# Patient Record
Sex: FEMALE | Race: BLACK OR AFRICAN AMERICAN | NOT HISPANIC OR LATINO | URBAN - METROPOLITAN AREA
[De-identification: names, ages, dates, MRNs, and addresses within clinical notes are randomized per-mention and may not be internally consistent; named-entity substitution may affect disease eponyms.]

---

## 2018-01-31 PROBLEM — Z00.00 ENCOUNTER FOR PREVENTIVE HEALTH EXAMINATION: Status: ACTIVE | Noted: 2018-01-31

## 2019-09-03 ENCOUNTER — EMERGENCY (EMERGENCY)
Facility: HOSPITAL | Age: 24
LOS: 1 days | Discharge: HOME | End: 2019-09-03
Attending: EMERGENCY MEDICINE | Admitting: EMERGENCY MEDICINE
Payer: MEDICARE

## 2019-09-03 VITALS
TEMPERATURE: 98 F | OXYGEN SATURATION: 100 % | RESPIRATION RATE: 18 BRPM | SYSTOLIC BLOOD PRESSURE: 132 MMHG | HEART RATE: 92 BPM | DIASTOLIC BLOOD PRESSURE: 78 MMHG

## 2019-09-03 PROCEDURE — 71046 X-RAY EXAM CHEST 2 VIEWS: CPT | Mod: 26

## 2019-09-03 PROCEDURE — 99283 EMERGENCY DEPT VISIT LOW MDM: CPT

## 2019-09-03 NOTE — ED PROVIDER NOTE - OBJECTIVE STATEMENT
24F with pmh of DM and intellectual disability p/w cough x 3 days. Pt is coming from group home. Staff reports that the patient starts gagging after coughing fits but no vomiting. No f/c, abd pain. Pt has normal appetite and has been having normal mental status. Hx from pt is limited 2/2 baseline intellectual disability.

## 2019-09-03 NOTE — ED PROVIDER NOTE - PATIENT PORTAL LINK FT
You can access the FollowMyHealth Patient Portal offered by [facility] by registering at the following website: [email or web address]. By joining [email or web address]’s FollowMyHealth portal, you will also be able to view your health information using other applications (apps) compatible with our system.

## 2019-09-03 NOTE — ED ADULT TRIAGE NOTE - CHIEF COMPLAINT QUOTE
Pt. arrives from [facility] [place] ([facility] (inpatient) with staff member at bedside, c/o dry cough x 3 days. Denies fever, chills, SOB. PMH: intellectual disability, DM. . Pt. arrives from [facility] [place] ([facility] (inpatient) with staff member at bedside, c/o dry cough x 3 days. Denies fever, chills, SOB. Calm and cooperative at present. Group home staff member states she will stay with pt. PMH: intellectual disability, DM. .

## 2019-09-03 NOTE — ED PROVIDER NOTE - PHYSICAL EXAMINATION
GEN - NAD; well appearing; alert, interactive  HEAD - NC/AT     EYES - EOMI, no conjunctival pallor, no scleral icterus  ENT -   mucous membranes  moist , no discharge      NECK - Neck supple  PULM - CTA b/l,  symmetric breath sounds  COR -  RRR, S1 S2, no murmurs  ABD - , ND, NT, soft, no guarding, no rebound, no masses    BACK - no CVA tenderness, nontender spine     EXTREMS - no edema, no deformity, warm and well perfused    SKIN - no rash or bruising      NEUROLOGIC - alert, sensation nl, motor 5/5 RUE/LUE/RLE/LLE

## 2019-09-03 NOTE — ED PROVIDER NOTE - CLINICAL SUMMARY MEDICAL DECISION MAKING FREE TEXT BOX
24F with pmh of DM, intellectual disability p/w cough x3 days. clear lungs, afebrile. likely viral syndrome, will r/o pna. possibly acute bronchitis. plan for CXR and outpatient f/u.

## 2019-09-03 NOTE — ED ADULT NURSE NOTE - CHIEF COMPLAINT QUOTE
Pt. arrives from [facility] [place] ([facility] Home (inpatient) with staff member at bedside, c/o dry cough x 3 days. Denies fever, chills, SOB. Calm and cooperative at present. Group home staff member states she will stay with pt. PMH: intellectual disability, DM. .

## 2019-12-27 ENCOUNTER — EMERGENCY (EMERGENCY)
Facility: HOSPITAL | Age: 24
LOS: 1 days | Discharge: HOME | End: 2019-12-27
Admitting: EMERGENCY MEDICINE
Payer: MEDICARE

## 2019-12-27 VITALS
OXYGEN SATURATION: 97 % | HEART RATE: 112 BPM | SYSTOLIC BLOOD PRESSURE: 127 MMHG | RESPIRATION RATE: 16 BRPM | TEMPERATURE: 98 F | DIASTOLIC BLOOD PRESSURE: 80 MMHG

## 2019-12-27 PROCEDURE — 99284 EMERGENCY DEPT VISIT MOD MDM: CPT

## 2019-12-27 RX ORDER — HALOPERIDOL DECANOATE 100 MG/ML
2.5 INJECTION INTRAMUSCULAR ONCE
Refills: 0 | Status: COMPLETED | OUTPATIENT
Start: 2019-12-27 | End: 2019-12-27

## 2019-12-27 RX ADMIN — Medication 2 MILLIGRAM(S): at 23:27

## 2019-12-27 RX ADMIN — HALOPERIDOL DECANOATE 2.5 MILLIGRAM(S): 100 INJECTION INTRAMUSCULAR at 23:27
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
## 2019-12-27 NOTE — ED PROVIDER NOTE - PATIENT PORTAL LINK FT
You can access the FollowMyHealth Patient Portal offered by [facility] by registering at the following website: [email or web address]. By joining [employer]’s FollowMyHealth portal, you will also be able to view your health information using other applications (apps) compatible with our system.
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
## 2019-12-28 PROBLEM — E11.9 TYPE 2 DIABETES MELLITUS WITHOUT COMPLICATIONS: Chronic | Status: ACTIVE | Noted: 2019-09-03

## 2019-12-28 PROBLEM — F79 UNSPECIFIED INTELLECTUAL DISABILITIES: Chronic | Status: ACTIVE | Noted: 2019-09-03

## 2019-12-28 NOTE — ED ADULT NURSE REASSESSMENT NOTE - GENERAL PATIENT STATE
pt remains awake, calm at baseline mental status. NAD, no aggression observed/comfortable appearance/cooperative

## 2020-01-12 NOTE — ED ADULT NURSE NOTE - NSFALLRSKOUTCOME_ED_ALL_ED
Please follow-up with Dr. Irizarry's office on January 21 at 9:30 AM.
Universal Safety Interventions

## 2022-10-17 ENCOUNTER — APPOINTMENT (OUTPATIENT)
Dept: OPHTHALMOLOGY | Facility: CLINIC | Age: 27
End: 2022-10-17

## 2022-11-03 PROBLEM — Z00.00 ENCOUNTER FOR PREVENTIVE HEALTH EXAMINATION: Status: ACTIVE | Noted: 2022-11-03

## 2022-11-11 ENCOUNTER — APPOINTMENT (OUTPATIENT)
Dept: OPHTHALMOLOGY | Facility: CLINIC | Age: 27
End: 2022-11-11

## 2022-11-30 ENCOUNTER — APPOINTMENT (OUTPATIENT)
Dept: OBGYN | Facility: HOSPITAL | Age: 27
End: 2022-11-30

## 2022-12-08 ENCOUNTER — OUTPATIENT (OUTPATIENT)
Dept: OUTPATIENT SERVICES | Facility: HOSPITAL | Age: 27
LOS: 1 days | End: 2022-12-08

## 2022-12-08 ENCOUNTER — APPOINTMENT (OUTPATIENT)
Dept: OBGYN | Facility: HOSPITAL | Age: 27
End: 2022-12-08
Payer: MEDICARE

## 2022-12-08 VITALS
TEMPERATURE: 97.9 F | SYSTOLIC BLOOD PRESSURE: 112 MMHG | BODY MASS INDEX: 37.77 KG/M2 | DIASTOLIC BLOOD PRESSURE: 68 MMHG | HEIGHT: 66 IN | HEART RATE: 104 BPM | WEIGHT: 235 LBS

## 2022-12-08 DIAGNOSIS — Z86.79 PERSONAL HISTORY OF OTHER DISEASES OF THE CIRCULATORY SYSTEM: ICD-10-CM

## 2022-12-08 DIAGNOSIS — F71 MODERATE INTELLECTUAL DISABILITIES: ICD-10-CM

## 2022-12-08 DIAGNOSIS — Z87.19 PERSONAL HISTORY OF OTHER DISEASES OF THE DIGESTIVE SYSTEM: ICD-10-CM

## 2022-12-08 DIAGNOSIS — F31.9 BIPOLAR DISORDER, UNSPECIFIED: ICD-10-CM

## 2022-12-08 DIAGNOSIS — Z86.39 PERSONAL HISTORY OF OTHER ENDOCRINE, NUTRITIONAL AND METABOLIC DISEASE: ICD-10-CM

## 2022-12-08 PROCEDURE — 99202 OFFICE O/P NEW SF 15 MIN: CPT | Mod: GE

## 2022-12-08 RX ORDER — DOCUSATE SODIUM 100 MG/1
CAPSULE, LIQUID FILLED ORAL
Refills: 0 | Status: ACTIVE | COMMUNITY

## 2022-12-08 RX ORDER — DESMOPRESSIN ACETATE 0.2 MG/1
TABLET ORAL
Refills: 0 | Status: ACTIVE | COMMUNITY

## 2022-12-08 RX ORDER — METFORMIN HYDROCHLORIDE 625 MG/1
TABLET ORAL
Refills: 0 | Status: ACTIVE | COMMUNITY

## 2022-12-08 RX ORDER — QUETIAPINE FUMARATE 400 MG/1
TABLET ORAL
Refills: 0 | Status: ACTIVE | COMMUNITY

## 2022-12-08 RX ORDER — LISINOPRIL 30 MG/1
TABLET ORAL
Refills: 0 | Status: ACTIVE | COMMUNITY

## 2022-12-08 RX ORDER — FAMOTIDINE 10 MG/1
TABLET, FILM COATED ORAL
Refills: 0 | Status: ACTIVE | COMMUNITY

## 2022-12-08 RX ORDER — DIVALPROEX SODIUM 500 MG/1
TABLET, DELAYED RELEASE ORAL
Refills: 0 | Status: ACTIVE | COMMUNITY

## 2022-12-09 DIAGNOSIS — Z00.00 ENCOUNTER FOR GENERAL ADULT MEDICAL EXAMINATION WITHOUT ABNORMAL FINDINGS: ICD-10-CM

## 2022-12-27 ENCOUNTER — NON-APPOINTMENT (OUTPATIENT)
Age: 27
End: 2022-12-27

## 2022-12-28 ENCOUNTER — APPOINTMENT (OUTPATIENT)
Dept: OBGYN | Facility: HOSPITAL | Age: 27
End: 2022-12-28

## 2023-01-03 NOTE — ED ADULT NURSE NOTE - CAS TRG GEN SKIN COLOR
(Newest Message First)  ROSSANA Palomo - CNP  [facility] [address] Staff 3 minutes ago (5:24 PM)     She can do an evisit - will need to be seen in office for next three month refill after that      CALLED, LMOM TO ADVISE.EUNICE Normal for race

## 2023-03-28 NOTE — ED PROVIDER NOTE - NSFOLLOWUPCLINICS_GEN_ALL_ED_FT
History/Exam/Medical decision making [facility]  Behavioral Health  [id number] [place] [address]  Phone: [phone]  Fax:   Follow Up Time:

## 2023-08-26 ENCOUNTER — EMERGENCY (EMERGENCY)
Facility: HOSPITAL | Age: 28
LOS: 1 days | Discharge: HOME | End: 2023-08-26
Attending: STUDENT IN AN ORGANIZED HEALTH CARE EDUCATION/TRAINING PROGRAM | Admitting: STUDENT IN AN ORGANIZED HEALTH CARE EDUCATION/TRAINING PROGRAM
Payer: MEDICARE

## 2023-08-26 VITALS
OXYGEN SATURATION: 99 % | DIASTOLIC BLOOD PRESSURE: 78 MMHG | RESPIRATION RATE: 20 BRPM | SYSTOLIC BLOOD PRESSURE: 127 MMHG | HEART RATE: 107 BPM | TEMPERATURE: 100 F

## 2023-08-26 LAB
ALBUMIN SERPL ELPH-MCNC: 4.1 G/DL — SIGNIFICANT CHANGE UP (ref 3.3–5)
ALP SERPL-CCNC: 72 U/L — SIGNIFICANT CHANGE UP (ref 40–120)
ALT FLD-CCNC: 16 U/L — SIGNIFICANT CHANGE UP (ref 4–33)
ANION GAP SERPL CALC-SCNC: 16 MMOL/L — HIGH (ref 7–14)
AST SERPL-CCNC: 17 U/L — SIGNIFICANT CHANGE UP (ref 4–32)
BASOPHILS # BLD AUTO: 0.03 K/UL — SIGNIFICANT CHANGE UP (ref 0–0.2)
BASOPHILS NFR BLD AUTO: 0.3 % — SIGNIFICANT CHANGE UP (ref 0–2)
BILIRUB SERPL-MCNC: <0.2 MG/DL — SIGNIFICANT CHANGE UP (ref 0.2–1.2)
BUN SERPL-MCNC: 9 MG/DL — SIGNIFICANT CHANGE UP (ref 7–23)
CALCIUM SERPL-MCNC: 9.5 MG/DL — SIGNIFICANT CHANGE UP (ref 8.4–10.5)
CHLORIDE SERPL-SCNC: 102 MMOL/L — SIGNIFICANT CHANGE UP (ref 98–107)
CO2 SERPL-SCNC: 22 MMOL/L — SIGNIFICANT CHANGE UP (ref 22–31)
CREAT SERPL-MCNC: 0.63 MG/DL — SIGNIFICANT CHANGE UP (ref 0.5–1.3)
EGFR: 124 ML/MIN/1.73M2 — SIGNIFICANT CHANGE UP
EOSINOPHIL # BLD AUTO: 0.02 K/UL — SIGNIFICANT CHANGE UP (ref 0–0.5)
EOSINOPHIL NFR BLD AUTO: 0.2 % — SIGNIFICANT CHANGE UP (ref 0–6)
GLUCOSE SERPL-MCNC: 108 MG/DL — HIGH (ref 70–99)
HCT VFR BLD CALC: 34.9 % — SIGNIFICANT CHANGE UP (ref 34.5–45)
HGB BLD-MCNC: 10.4 G/DL — LOW (ref 11.5–15.5)
IANC: 6.35 K/UL — SIGNIFICANT CHANGE UP (ref 1.8–7.4)
IMM GRANULOCYTES NFR BLD AUTO: 0.2 % — SIGNIFICANT CHANGE UP (ref 0–0.9)
LYMPHOCYTES # BLD AUTO: 2.14 K/UL — SIGNIFICANT CHANGE UP (ref 1–3.3)
LYMPHOCYTES # BLD AUTO: 23.2 % — SIGNIFICANT CHANGE UP (ref 13–44)
MCHC RBC-ENTMCNC: 20.1 PG — LOW (ref 27–34)
MCHC RBC-ENTMCNC: 29.8 GM/DL — LOW (ref 32–36)
MCV RBC AUTO: 67.4 FL — LOW (ref 80–100)
MONOCYTES # BLD AUTO: 0.65 K/UL — SIGNIFICANT CHANGE UP (ref 0–0.9)
MONOCYTES NFR BLD AUTO: 7.1 % — SIGNIFICANT CHANGE UP (ref 2–14)
NEUTROPHILS # BLD AUTO: 6.35 K/UL — SIGNIFICANT CHANGE UP (ref 1.8–7.4)
NEUTROPHILS NFR BLD AUTO: 69 % — SIGNIFICANT CHANGE UP (ref 43–77)
NRBC # BLD: 0 /100 WBCS — SIGNIFICANT CHANGE UP (ref 0–0)
NRBC # FLD: 0 K/UL — SIGNIFICANT CHANGE UP (ref 0–0)
PLATELET # BLD AUTO: 342 K/UL — SIGNIFICANT CHANGE UP (ref 150–400)
POTASSIUM SERPL-MCNC: 4.1 MMOL/L — SIGNIFICANT CHANGE UP (ref 3.5–5.3)
POTASSIUM SERPL-SCNC: 4.1 MMOL/L — SIGNIFICANT CHANGE UP (ref 3.5–5.3)
PROT SERPL-MCNC: 7.3 G/DL — SIGNIFICANT CHANGE UP (ref 6–8.3)
RBC # BLD: 5.18 M/UL — SIGNIFICANT CHANGE UP (ref 3.8–5.2)
RBC # FLD: 16.1 % — HIGH (ref 10.3–14.5)
SODIUM SERPL-SCNC: 140 MMOL/L — SIGNIFICANT CHANGE UP (ref 135–145)
WBC # BLD: 9.21 K/UL — SIGNIFICANT CHANGE UP (ref 3.8–10.5)
WBC # FLD AUTO: 9.21 K/UL — SIGNIFICANT CHANGE UP (ref 3.8–10.5)

## 2023-08-26 PROCEDURE — 99285 EMERGENCY DEPT VISIT HI MDM: CPT | Mod: FS

## 2023-08-26 RX ORDER — DIPHENHYDRAMINE HCL 50 MG
50 CAPSULE ORAL ONCE
Refills: 0 | Status: COMPLETED | OUTPATIENT
Start: 2023-08-26 | End: 2023-08-26

## 2023-08-26 RX ADMIN — Medication 50 MILLIGRAM(S): at 16:52

## 2023-08-26 NOTE — ED PROVIDER NOTE - PATIENT PORTAL LINK FT
You can access the FollowMyHealth Patient Portal offered by [facility] by registering at the following website: [email or web address]. By joining [employer]’s FollowMyHealth portal, you will also be able to view your health information using other applications (apps) compatible with our system.

## 2023-08-26 NOTE — ED PROVIDER NOTE - PHYSICAL EXAMINATION
CONSTITUTIONAL: non-verbal, uncooperative   HEAD: NCAT  EYES: exam severely limited by cooperation, some swelling periorbital and upper eyelid, sclera injected, pupil round - unable to assess EOM  ENT: MMM  NECK: Supple; non tender cervical spine, no step off over face or spine  CARD: RRR  RESP: lungs CTA b/l   ABD: soft, no guarding/rebound on exam   EXT: no edema CONSTITUTIONAL: non-verbal, uncooperative   HEAD: NCAT  EYES: exam severely limited by cooperation, some swelling periorbital and upper eyelid, sclera appeared injected, pupil round - unable to assess EOM   	ADDENDUM: please see progress note for rest of eye exam under tetracaine   ENT: MMM  NECK: Supple; non tender cervical spine, no step off over face or spine  CARD: RRR  RESP: lungs CTA b/l   ABD: soft, no guarding/rebound on exam   EXT: no edema

## 2023-08-26 NOTE — ED ADULT TRIAGE NOTE - SPO2 (%)
99 Saucerization Excision Additional Text (Leave Blank If You Do Not Want): The margin was drawn around the clinically apparent lesion.  Incisions were then made along these lines, in a tangential fashion, to the appropriate tissue plane and the lesion was extirpated.
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
## 2023-08-26 NOTE — ED PROVIDER NOTE - NSFOLLOWUPINSTRUCTIONS_ED_ALL_ED_FT
Rest, drink plenty of fluids.  Advance activity as tolerated.  Continue all previously prescribed medications as directed.  Follow up with your primary care physician in 48-72 hours- bring copies of your results.  Return to the ER for worsening or persistent symptoms, and/or ANY NEW OR CONCERNING SYMPTOMS. If you have issues obtaining follow up, please call: [phone] to obtain a doctor or specialist who takes your insurance in your area.  You may call [phone] to make an appointment with the internal medicine clinic.
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
## 2023-08-27 VITALS
OXYGEN SATURATION: 100 % | SYSTOLIC BLOOD PRESSURE: 118 MMHG | RESPIRATION RATE: 18 BRPM | DIASTOLIC BLOOD PRESSURE: 85 MMHG | HEART RATE: 97 BPM | TEMPERATURE: 98 F

## 2023-08-27 PROCEDURE — 70487 CT MAXILLOFACIAL W/DYE: CPT | Mod: 26,MA

## 2023-08-27 RX ORDER — ERYTHROMYCIN BASE 5 MG/GRAM
1 OINTMENT (GRAM) OPHTHALMIC (EYE) ONCE
Refills: 0 | Status: COMPLETED | OUTPATIENT
Start: 2023-08-27 | End: 2023-08-27

## 2023-08-27 RX ORDER — OFLOXACIN 0.3 %
1 DROPS OPHTHALMIC (EYE) ONCE
Refills: 0 | Status: COMPLETED | OUTPATIENT
Start: 2023-08-27 | End: 2023-08-27

## 2023-08-27 RX ADMIN — Medication 1 DROP(S): at 10:18

## 2023-08-27 RX ADMIN — Medication 1 APPLICATION(S): at 10:18
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
## 2023-08-27 NOTE — CONSULT NOTE ADULT - PROVIDER SPECIALTY LIST ADULT
Ophthalmology Brow Lift Text: A midfrontal incision was made medially to the defect to allow access to the tissues just superior to the left eyebrow. Following careful dissection inferiorly in a supraperiosteal plane to the level of the left eyebrow, several 3-0 monocryl sutures were used to resuspend the eyebrow orbicularis oculi muscular unit to the superior frontal bone periosteum. This resulted in an appropriate reapproximation of static eyebrow symmetry and correction of the left brow ptosis.

## 2023-08-27 NOTE — ED ADULT NURSE REASSESSMENT NOTE - NS ED NURSE REASSESS COMMENT FT1
Pt calm and cooperative. Medicated as per EMAR. Pt discharged back to group home. Staff member at bedside.
Pt received in room 17. pt minimally verbal, calm and cooperative. hx of intellectual disability with aid at bedside, swelling noted to right eye, pt able to open eye, unable to confirm or denies blurriness in vision. Awaiting for CT
Pt resting comfortably in bed. In no apparent distress. VS as noted. Awaiting for optho followup.
Received report from night shift RN Denisse. Osbaldoho at bedside for eval. Staff member at bedside.
Pt remains in ED, resting in stretcher with eyes closed, respirations even/unlabored, on room air. Pt awaiting CT and optho consult. Group home staff member remains at bedside. Pt offers no c/o pain or discomfort at this time. All safety precautions in place.

## 2023-08-27 NOTE — CONSULT NOTE ADULT - ASSESSMENT
Assessment and Recommendations:  28y female with past medical history of intellectual disability, no ocular history, consulted for ocular trauma right eye.     #Ocular Trauma Right Eye  - Unable to obtain accurate Snellen Va due  to intellectual disability and lack of cooperation. Pt fixes and follows OU  - Perrl NO APD. Round pupils.   - IOP 18, AC formed, no hyphema. Iris intact  - Cornea with diffuse epi defect. Conj with  temporal chemosis. Kadeem negative  - Retina flat, no RD Optic nerve with shapr disc margins OU  - No evidence of globe rupture clinically  - Radiology reviewed 0 noted read of globe rupture, however no signs of rupture clinically. No facial fractures.   - Recommend ofloxacin qid and erythromycin BID OD.   - Will arrange for follow-up.      JED Olsen, trauma attending    Outpatient Follow-up: Patient should follow-up with his/her ophthalmologist or with Health system Department of Ophthalmology within 1 week of after discharge at:    [address]  [place] [address]  [phone]    Parth Cifuentes MD PGY 3  Available on Microsoft Teams Assessment and Recommendations:  28y female with past medical history of intellectual disability, no ocular history, consulted for ocular trauma right eye.     #Ocular Trauma Right Eye  - Unable to obtain accurate Snellen Va due  to intellectual disability and lack of cooperation. Pt fixes and follows OU  - Perrl NO APD. Round pupils.   - IOP 18, AC formed, no hyphema. Iris intact  - Cornea with diffuse epi defect. Conj with  temporal chemosis. Kadeem negative  - Retina flat, no RD Optic nerve with shapr disc margins OU  - No evidence of globe rupture clinically  - Radiology reviewed 0 noted read of globe rupture, however no signs of rupture clinically. No facial fractures.   - Recommend ofloxacin qid and erythromycin BID OD.   - Will arrange for follow-up.      JED Olsen, trauma attending    Outpatient Follow-up: Patient should follow-up with his/her ophthalmologist or with [facility] Department of Ophthalmology within 1 week of after discharge at:    [address]  [place] [address]  [phone]    Parth Cifuentes MD PGY 3  Available on Microsoft Teams Assessment and Recommendations:  28y female with past medical history of intellectual disability, no ocular history, consulted for ocular trauma right eye.     #Ocular Trauma Right Eye  - Unable to obtain accurate Snellen Va due  to intellectual disability and lack of cooperation. Pt fixes and follows OU  - Perrl NO APD. Round pupils.   - IOP 18, AC formed, no hyphema. Iris intact  - Cornea with diffuse epi defect. Conj with  temporal chemosis. Kadeem negative  - Retina flat, no RD Optic nerve with shapr disc margins OU  - No evidence of globe rupture clinically  - Radiology reviewed 0 noted read of globe rupture, however no signs of rupture clinically. No facial fractures.   - Recommend ofloxacin qid and erythromycin BID OD.   - Will arrange for follow-up.      JED Olsen, trauma attending    Outpatient Follow-up: Patient should follow-up with his/her ophthalmologist or with [facility] Department of Ophthalmology within 1 week of after discharge at:    [address]  [place] [address]  [phone]    Parth Cifuentes MD PGY 3  Available on Microsoft Teams

## 2024-02-10 ENCOUNTER — INPATIENT (INPATIENT)
Facility: HOSPITAL | Age: 29
LOS: 5 days | Discharge: HOME | End: 2024-02-16
Attending: HOSPITALIST | Admitting: HOSPITALIST
Payer: MEDICARE

## 2024-02-10 VITALS
HEART RATE: 96 BPM | RESPIRATION RATE: 16 BRPM | TEMPERATURE: 98 F | DIASTOLIC BLOOD PRESSURE: 75 MMHG | OXYGEN SATURATION: 100 % | SYSTOLIC BLOOD PRESSURE: 111 MMHG

## 2024-02-10 PROCEDURE — 99285 EMERGENCY DEPT VISIT HI MDM: CPT

## 2024-02-10 NOTE — ED ADULT TRIAGE NOTE - NS ED NURSE BANDS TYPE
Leatha GARCIA is a 52 year old female presenting with non-radiating lower back pain.       ALLERGIES:  No Known Allergies     Denies Latex allergy or sensitivity.     Patient notes that she currently is not a smoker.    Visit Vitals  LMP 09/21/2021       No changes to Patient's medical history or social history since their last visit.         Name band;

## 2024-02-11 DIAGNOSIS — K81.0 ACUTE CHOLECYSTITIS: ICD-10-CM

## 2024-02-11 DIAGNOSIS — Z01.818 ENCOUNTER FOR OTHER PREPROCEDURAL EXAMINATION: ICD-10-CM

## 2024-02-11 DIAGNOSIS — E11.9 TYPE 2 DIABETES MELLITUS WITHOUT COMPLICATIONS: ICD-10-CM

## 2024-02-11 DIAGNOSIS — Z29.9 ENCOUNTER FOR PROPHYLACTIC MEASURES, UNSPECIFIED: ICD-10-CM

## 2024-02-11 DIAGNOSIS — I10 ESSENTIAL (PRIMARY) HYPERTENSION: ICD-10-CM

## 2024-02-11 DIAGNOSIS — R10.9 UNSPECIFIED ABDOMINAL PAIN: ICD-10-CM

## 2024-02-11 DIAGNOSIS — Z79.899 OTHER LONG TERM (CURRENT) DRUG THERAPY: ICD-10-CM

## 2024-02-11 DIAGNOSIS — N30.00 ACUTE CYSTITIS WITHOUT HEMATURIA: ICD-10-CM

## 2024-02-11 LAB
ALBUMIN SERPL ELPH-MCNC: 4 G/DL — SIGNIFICANT CHANGE UP (ref 3.3–5)
ALP SERPL-CCNC: 163 U/L — HIGH (ref 40–120)
ALT FLD-CCNC: 343 U/L — HIGH (ref 4–33)
ANION GAP SERPL CALC-SCNC: 18 MMOL/L — HIGH (ref 7–14)
APPEARANCE UR: ABNORMAL
AST SERPL-CCNC: 452 U/L — HIGH (ref 4–32)
BACTERIA # UR AUTO: ABNORMAL /HPF
BASOPHILS # BLD AUTO: 0.02 K/UL — SIGNIFICANT CHANGE UP (ref 0–0.2)
BASOPHILS NFR BLD AUTO: 0.2 % — SIGNIFICANT CHANGE UP (ref 0–2)
BILIRUB SERPL-MCNC: 0.9 MG/DL — SIGNIFICANT CHANGE UP (ref 0.2–1.2)
BILIRUB UR-MCNC: ABNORMAL
BUN SERPL-MCNC: 11 MG/DL — SIGNIFICANT CHANGE UP (ref 7–23)
CALCIUM SERPL-MCNC: 9.5 MG/DL — SIGNIFICANT CHANGE UP (ref 8.4–10.5)
CAST: 1 /LPF — SIGNIFICANT CHANGE UP (ref 0–4)
CHLORIDE SERPL-SCNC: 101 MMOL/L — SIGNIFICANT CHANGE UP (ref 98–107)
CO2 SERPL-SCNC: 17 MMOL/L — LOW (ref 22–31)
COLOR SPEC: SIGNIFICANT CHANGE UP
CREAT SERPL-MCNC: 0.59 MG/DL — SIGNIFICANT CHANGE UP (ref 0.5–1.3)
DIFF PNL FLD: NEGATIVE — SIGNIFICANT CHANGE UP
EGFR: 126 ML/MIN/1.73M2 — SIGNIFICANT CHANGE UP
EOSINOPHIL # BLD AUTO: 0 K/UL — SIGNIFICANT CHANGE UP (ref 0–0.5)
EOSINOPHIL NFR BLD AUTO: 0 % — SIGNIFICANT CHANGE UP (ref 0–6)
GLUCOSE SERPL-MCNC: 106 MG/DL — HIGH (ref 70–99)
GLUCOSE UR QL: NEGATIVE MG/DL — SIGNIFICANT CHANGE UP
HCT VFR BLD CALC: 36.8 % — SIGNIFICANT CHANGE UP (ref 34.5–45)
HGB BLD-MCNC: 11.5 G/DL — SIGNIFICANT CHANGE UP (ref 11.5–15.5)
IANC: 8.79 K/UL — HIGH (ref 1.8–7.4)
IMM GRANULOCYTES NFR BLD AUTO: 0.5 % — SIGNIFICANT CHANGE UP (ref 0–0.9)
KETONES UR-MCNC: 15 MG/DL
LEUKOCYTE ESTERASE UR-ACNC: ABNORMAL
LYMPHOCYTES # BLD AUTO: 19.3 % — SIGNIFICANT CHANGE UP (ref 13–44)
LYMPHOCYTES # BLD AUTO: 2.32 K/UL — SIGNIFICANT CHANGE UP (ref 1–3.3)
MCHC RBC-ENTMCNC: 20.4 PG — LOW (ref 27–34)
MCHC RBC-ENTMCNC: 31.3 GM/DL — LOW (ref 32–36)
MCV RBC AUTO: 65.1 FL — LOW (ref 80–100)
MONOCYTES # BLD AUTO: 0.82 K/UL — SIGNIFICANT CHANGE UP (ref 0–0.9)
MONOCYTES NFR BLD AUTO: 6.8 % — SIGNIFICANT CHANGE UP (ref 2–14)
NEUTROPHILS # BLD AUTO: 8.79 K/UL — HIGH (ref 1.8–7.4)
NEUTROPHILS NFR BLD AUTO: 73.2 % — SIGNIFICANT CHANGE UP (ref 43–77)
NITRITE UR-MCNC: NEGATIVE — SIGNIFICANT CHANGE UP
NRBC # BLD: 0 /100 WBCS — SIGNIFICANT CHANGE UP (ref 0–0)
NRBC # FLD: 0 K/UL — SIGNIFICANT CHANGE UP (ref 0–0)
PH UR: 6.5 — SIGNIFICANT CHANGE UP (ref 5–8)
PLATELET # BLD AUTO: 347 K/UL — SIGNIFICANT CHANGE UP (ref 150–400)
POTASSIUM SERPL-MCNC: 5 MMOL/L — SIGNIFICANT CHANGE UP (ref 3.5–5.3)
POTASSIUM SERPL-SCNC: 5 MMOL/L — SIGNIFICANT CHANGE UP (ref 3.5–5.3)
PROT SERPL-MCNC: 7.6 G/DL — SIGNIFICANT CHANGE UP (ref 6–8.3)
PROT UR-MCNC: SIGNIFICANT CHANGE UP MG/DL
RBC # BLD: 5.65 M/UL — HIGH (ref 3.8–5.2)
RBC # FLD: 17.8 % — HIGH (ref 10.3–14.5)
RBC CASTS # UR COMP ASSIST: 52 /HPF — HIGH (ref 0–4)
SODIUM SERPL-SCNC: 136 MMOL/L — SIGNIFICANT CHANGE UP (ref 135–145)
SP GR SPEC: 1.02 — SIGNIFICANT CHANGE UP (ref 1–1.03)
SQUAMOUS # UR AUTO: 8 /HPF — HIGH (ref 0–5)
UROBILINOGEN FLD QL: 2 MG/DL (ref 0.2–1)
WBC # BLD: 12.01 K/UL — HIGH (ref 3.8–10.5)
WBC # FLD AUTO: 12.01 K/UL — HIGH (ref 3.8–10.5)
WBC UR QL: 2 /HPF — SIGNIFICANT CHANGE UP (ref 0–5)

## 2024-02-11 PROCEDURE — 76705 ECHO EXAM OF ABDOMEN: CPT | Mod: 26

## 2024-02-11 PROCEDURE — 99232 SBSQ HOSP IP/OBS MODERATE 35: CPT | Mod: GC

## 2024-02-11 PROCEDURE — 78226 HEPATOBILIARY SYSTEM IMAGING: CPT | Mod: 26,GC

## 2024-02-11 PROCEDURE — 74177 CT ABD & PELVIS W/CONTRAST: CPT | Mod: 26,MA

## 2024-02-11 PROCEDURE — 99223 1ST HOSP IP/OBS HIGH 75: CPT

## 2024-02-11 RX ORDER — LANOLIN ALCOHOL/MO/W.PET/CERES
3 CREAM (GRAM) TOPICAL AT BEDTIME
Refills: 0 | Status: DISCONTINUED | OUTPATIENT
Start: 2024-02-11 | End: 2024-02-16

## 2024-02-11 RX ORDER — DIVALPROEX SODIUM 500 MG/1
500 TABLET, DELAYED RELEASE ORAL
Refills: 0 | Status: ACTIVE | OUTPATIENT
Start: 2024-02-11 | End: 2025-01-09

## 2024-02-11 RX ORDER — DEXTROSE 50 % IN WATER 50 %
12.5 SYRINGE (ML) INTRAVENOUS ONCE
Refills: 0 | Status: DISCONTINUED | OUTPATIENT
Start: 2024-02-11 | End: 2024-02-16

## 2024-02-11 RX ORDER — GLUCAGON INJECTION, SOLUTION 0.5 MG/.1ML
1 INJECTION, SOLUTION SUBCUTANEOUS ONCE
Refills: 0 | Status: DISCONTINUED | OUTPATIENT
Start: 2024-02-11 | End: 2024-02-16

## 2024-02-11 RX ORDER — DEXTROSE 50 % IN WATER 50 %
25 SYRINGE (ML) INTRAVENOUS ONCE
Refills: 0 | Status: DISCONTINUED | OUTPATIENT
Start: 2024-02-11 | End: 2024-02-16

## 2024-02-11 RX ORDER — PIPERACILLIN AND TAZOBACTAM 4; .5 G/20ML; G/20ML
3.38 INJECTION, POWDER, LYOPHILIZED, FOR SOLUTION INTRAVENOUS ONCE
Refills: 0 | Status: COMPLETED | OUTPATIENT
Start: 2024-02-11 | End: 2024-02-11

## 2024-02-11 RX ORDER — METRONIDAZOLE 500 MG
500 TABLET ORAL ONCE
Refills: 0 | Status: COMPLETED | OUTPATIENT
Start: 2024-02-11 | End: 2024-02-11

## 2024-02-11 RX ORDER — SODIUM CHLORIDE 9 MG/ML
1000 INJECTION, SOLUTION INTRAVENOUS
Refills: 0 | Status: DISCONTINUED | OUTPATIENT
Start: 2024-02-11 | End: 2024-02-16

## 2024-02-11 RX ORDER — DEXTROSE 50 % IN WATER 50 %
15 SYRINGE (ML) INTRAVENOUS ONCE
Refills: 0 | Status: DISCONTINUED | OUTPATIENT
Start: 2024-02-11 | End: 2024-02-16

## 2024-02-11 RX ORDER — PIPERACILLIN AND TAZOBACTAM 4; .5 G/20ML; G/20ML
3.38 INJECTION, POWDER, LYOPHILIZED, FOR SOLUTION INTRAVENOUS ONCE
Refills: 0 | Status: COMPLETED | OUTPATIENT
Start: 2024-02-12 | End: 2024-02-12

## 2024-02-11 RX ORDER — INSULIN LISPRO 100/ML
VIAL (ML) SUBCUTANEOUS EVERY 6 HOURS
Refills: 0 | Status: DISCONTINUED | OUTPATIENT
Start: 2024-02-11 | End: 2024-02-13

## 2024-02-11 RX ORDER — METRONIDAZOLE 500 MG
TABLET ORAL
Refills: 0 | Status: DISCONTINUED | OUTPATIENT
Start: 2024-02-11 | End: 2024-02-11

## 2024-02-11 RX ORDER — ACETAMINOPHEN 500 MG
650 TABLET ORAL EVERY 6 HOURS
Refills: 0 | Status: ACTIVE | OUTPATIENT
Start: 2024-02-11 | End: 2025-01-09

## 2024-02-11 RX ORDER — FAMOTIDINE 10 MG/ML
40 INJECTION INTRAVENOUS DAILY
Refills: 0 | Status: DISCONTINUED | OUTPATIENT
Start: 2024-02-11 | End: 2024-02-16

## 2024-02-11 RX ORDER — LISINOPRIL 2.5 MG/1
20 TABLET ORAL DAILY
Refills: 0 | Status: ACTIVE | OUTPATIENT
Start: 2024-02-11 | End: 2025-01-09

## 2024-02-11 RX ORDER — METRONIDAZOLE 500 MG
500 TABLET ORAL EVERY 8 HOURS
Refills: 0 | Status: DISCONTINUED | OUTPATIENT
Start: 2024-02-11 | End: 2024-02-11

## 2024-02-11 RX ORDER — PIPERACILLIN AND TAZOBACTAM 4; .5 G/20ML; G/20ML
3.38 INJECTION, POWDER, LYOPHILIZED, FOR SOLUTION INTRAVENOUS EVERY 8 HOURS
Refills: 0 | Status: DISCONTINUED | OUTPATIENT
Start: 2024-02-12 | End: 2024-02-16

## 2024-02-11 RX ORDER — SODIUM CHLORIDE 9 MG/ML
1000 INJECTION, SOLUTION INTRAVENOUS ONCE
Refills: 0 | Status: COMPLETED | OUTPATIENT
Start: 2024-02-11 | End: 2024-02-11

## 2024-02-11 RX ORDER — CEFTRIAXONE 500 MG/1
1000 INJECTION, POWDER, FOR SOLUTION INTRAMUSCULAR; INTRAVENOUS ONCE
Refills: 0 | Status: COMPLETED | OUTPATIENT
Start: 2024-02-11 | End: 2024-02-11

## 2024-02-11 RX ADMIN — PIPERACILLIN AND TAZOBACTAM 25 GRAM(S): 4; .5 INJECTION, POWDER, LYOPHILIZED, FOR SOLUTION INTRAVENOUS at 17:49

## 2024-02-11 RX ADMIN — SODIUM CHLORIDE 1000 MILLILITER(S): 9 INJECTION, SOLUTION INTRAVENOUS at 02:21

## 2024-02-11 RX ADMIN — DIVALPROEX SODIUM 500 MILLIGRAM(S): 500 TABLET, DELAYED RELEASE ORAL at 17:49

## 2024-02-11 RX ADMIN — Medication 100 MILLIGRAM(S): at 08:54

## 2024-02-11 RX ADMIN — CEFTRIAXONE 100 MILLIGRAM(S): 500 INJECTION, POWDER, FOR SOLUTION INTRAMUSCULAR; INTRAVENOUS at 08:06

## 2024-02-11 RX ADMIN — PIPERACILLIN AND TAZOBACTAM 200 GRAM(S): 4; .5 INJECTION, POWDER, LYOPHILIZED, FOR SOLUTION INTRAVENOUS at 16:20

## 2024-02-11 NOTE — H&P ADULT - PROBLEM SELECTOR PLAN 1
#Suspected cholecystitis  - RUQ US: cholelithiasis with associated gallbladder wall thickening  - CTAP: mild gallbladder wall edema  - HIDA scan: significantly delayed gallbladder filling may be see with acute/subacute cholecystitis  -  and bilirubin noted in urine, ,   - trend LFTs  - empirically covered with zosyn  - avoid hepatotoxins - surgery evaluation appreciated – no acute surgical intervention as suspicion low for acute cholecystitis (prior to HIDA scan)  - appreciate surgery input

## 2024-02-11 NOTE — H&P ADULT - PROBLEM SELECTOR PLAN 7
#VTE PPx: SCDs since she may go to OR  #GI: famotidine  #Diet: NPO for possible OR  #Dispo: admit to medicine. F/u surgery.    Discussed with  and patient's brother Shahbaz Seth [phone]

## 2024-02-11 NOTE — ED PROVIDER NOTE - CARE PLAN
Principal Discharge DX:	Abdominal pain with vomiting  Secondary Diagnosis:	Gallstones  Secondary Diagnosis:	Acute UTI   1

## 2024-02-11 NOTE — ED PROVIDER NOTE - PROGRESS NOTE DETAILS
JOSE ANTONIO Garcia MD - PGY-5: Discussed with surgical resident, they are uncertain of cholecystitis, recommend HIDA scan, treatment with abx for UTI until that time.  They refuse admittance to their service until operation per resident Jacqueline who discussed with attending. JOSE ANTONIO Garcia MD - PGY-5: Discussed with surgical resident, they are uncertain of cholecystitis, recommend HIDA scan, treatment with abx for UTI until that time.  They recommend admittance to medicine service until operation per resident Jacqueline who discussed with attending. Discussed with representative from facility.  RN at facility # - [phone].    Brother who provides consent (Shahbaz Seth): [phone]

## 2024-02-11 NOTE — H&P ADULT - PROBLEM SELECTOR PLAN 2
- patient is planned potentially for laparoscopic cholecystectomy  - baseline mets >4 per staff and family  - no personal or family reactions to anesthesia, or excessive bleeding/clotting  - ECG: NSR, no ischemic ST-T changes  - no absolute contraindications such as acute coronary syndrome, decompensated heart failure, or malignant arrhythmia  - Cr <2, T2DM w/o long-term use of insulin, no hx of stroke, CAD, of CHF  - RCRI of 0 confers a 0.4% estimated rate of myocardial infarction, pulmonary edema, ventricular fibrillation, cardiac arrest, or complete heart block (Garland 1999) or a 3.9% 30-day risk of death, MI, or cardiac arrest (Neto 2017)  - patient is low risk for an intermediate risk procedure; no further cardiac testing is required at this time and she may proceed with the planned procedure

## 2024-02-11 NOTE — CONSULT NOTE ADULT - ATTENDING COMMENTS
Ayse displayed signs consistent with abdominal pain to her care team. CT reviewed, labs reviewed.   Given unclear exam, agree with HIDA  IV abx  Home meds  Medical consultation  Surgery to follow closely, please call with any acute change or questions.    Abdominal pain - NOS, urine culture pending  May need reimaging if does not improve      Shayan Hanley MD  Acute and Critical Care Surgery    The Acute Care Surgery (B Team) Attending Group Practice:  Dr. Albin Ricks, Dr. Siddhartha Veliz, Dr. Shayan Hanley,  Dr. Jono Haque and Dr. Leatha John     Urgent issues - spectra [id number] or [id number]  Nonurgent issues - [phone]  Patient appointments or after hours - [phone]

## 2024-02-11 NOTE — ED PROVIDER NOTE - OBJECTIVE STATEMENT
29 y/o female w PMHx of severe intellectual disability, HTN, diabetes, obesity, enlarged thyroid presenting with 4 episodes of vomiting following mac-and-cheese at lunch. Pt is nonverbal and acccompanied by , who provided most of the history. Pt has thrown up multiple times today, states vomit consisted of food, no bloody emesis. No one else in the group home got sick. No recent sick contacts or travel. No new medications. Denies fever, chills, cough, night sweats, chills, abdominal pain, pain with urination, diarrhea, constipation. 29 y/o female w PMHx of severe intellectual disability, HTN, diabetes, obesity, enlarged thyroid presenting with 4 episodes of vomiting following mac-and-cheese at lunch. Pt is nonverbal and accompanied by , who provided most of the history. Pt has thrown up multiple times today, states vomit consisted of food, no bloody emesis. No one else in the group home got sick. No recent sick contacts or travel. No new medications. Denies fever, chills, cough, night sweats, chills, abdominal pain, pain with urination, diarrhea, constipation.

## 2024-02-11 NOTE — H&P ADULT - PROBLEM SELECTOR PLAN 4
- UA: cloudy, 15 ketones, small bilirubin, small LE, 2 WBCs, 52 RBCs, mod bacteria, 8 epis  - CTAP: mild circumferential urinary bladder wall thickening compatible with cystitis  - obtain UCx  - empirically covered with zosyn

## 2024-02-11 NOTE — ED PROVIDER NOTE - ATTENDING CONTRIBUTION TO CARE
27yo F ho intellectual disability, minimally verbal, DM2, pw 3-4 episodes of vomiting today. started shortly after eating mac and cheese, does not express any abdominal pain, or urinary sx, has no ho abdominal surgeries, no diarrhea, no uri sx  pt well appearing, abd nonteder, consider gastritis, viral gastro  will cehck labs, supportive care, consider imaging if labs wnl or not improving/tolerating PO

## 2024-02-11 NOTE — CONSULT NOTE ADULT - ASSESSMENT
28 yof PMH HTN,DM, obesity and severe developmental delay presents from her group home for emesis x4 NBNB with her health aid. Unable to give any ROS/history but home health aid reports no other concerning symptoms noticed except emesis. Exam without any abdominal tenderness, bloating or distention. Labs with mild leukocytosis to 12 and isolate transaminitis and elevated alk phos. General surgery consulted for r/o cholecystitis.    Plan:  - no acute surgical intervention, low concern for acute cholecystitis at this time  - would obtain CTAP with IV contrast  to continue investigation of leukocytosis and clarify possible intraabdominal pathology  - Can also consider HIDA scan if CTAP does not result in significant finding  - Continue to attempt to clarify patient HCP/surrogate decision maker  - Surgery will follow    Discussed with Dr. Dayan VELARDE Team [id number] 28 yof PMH HTN,DM, obesity and severe developmental delay presents from her group home for emesis x4 NBNB with her health aid. Unable to give any ROS/history but home health aid reports no other concerning symptoms noticed except emesis. Exam without any abdominal tenderness, bloating or distention. Labs with mild leukocytosis to 12 and isolate transaminitis and elevated alk phos. General surgery consulted for r/o cholecystitis.    Plan:  - No acute surgical intervention, low concern for acute cholecystitis at this time  - RUQ US and CT scan reviewed      - Obtain HIDA scan for further evaluation   - Continue IV abx, recommend Zosyn   - UTI/cystitis treatment   - Continue to attempt to clarify patient HCP/surrogate decision maker  - Surgery will follow    Discussed with Dr. Dayan VELARDE Team [id number]

## 2024-02-11 NOTE — ED ADULT NURSE NOTE - OBJECTIVE STATEMENT
pt received to tr a  , a&ox0 , ambulatory , phx of DM 2 and intellectual disabilities , p/w vomiting today x 3 after eating Mac and cheese. pt has no active vomiting at this time  . Pt breathing even and unlabored on room air. Denies fever, chills, cough, SOB, chest pain, palpitations, dizziness, nausea, vomiting, diarrhea, constipation, numbness, tingling. pt educated on fall precautions and confirms understanding via teach back method. Stretcher locked in lowest position with siderails up x2. Call bell and personal items within reach.

## 2024-02-11 NOTE — H&P ADULT - NSHPLABSRESULTS_GEN_ALL_CORE
LABS:                        11.5   12. )-----------( 347      ( 2024 00:34 )             36.8         136  |  101  |  11  ----------------------------<  106<H>  5.0   |  17<L>  |  0.59    Ca    9.5      2024 00:34    TPro  7.6  /  Alb  4.0  /  TBili  0.9  /  DBili  x   /  AST  452<H>  /  ALT  343<H>  /  AlkPhos  163<H>            Urinalysis Basic - ( 2024 00:35 )    Color: Dark Yellow / Appearance: Cloudy / S.018 / pH: x  Gluc: x / Ketone: 15 mg/dL  / Bili: Small / Urobili: 2.0 mg/dL   Blood: x / Protein: Trace mg/dL / Nitrite: Negative   Leuk Esterase: Small / RBC: 52 /HPF / WBC 2 /HPF   Sq Epi: x / Non Sq Epi: 8 /HPF / Bacteria: Moderate /HPF

## 2024-02-11 NOTE — H&P ADULT - NSHPPHYSICALEXAM_GEN_ALL_CORE
PHYSICAL EXAM:  Vital Signs Last 24 Hrs  T(C): 36.1 (11 Feb 2024 13:28), Max: 36.8 (11 Feb 2024 04:25)  T(F): 97 (11 Feb 2024 13:28), Max: 98.3 (11 Feb 2024 04:25)  HR: 81 (11 Feb 2024 13:28) (81 - 96)  BP: 107/78 (11 Feb 2024 13:28) (107/78 - 119/87)  BP(mean): --  RR: 20 (11 Feb 2024 13:28) (16 - 20)  SpO2: 97% (11 Feb 2024 13:28) (97% - 100%)    Parameters below as of 11 Feb 2024 13:28  Patient On (Oxygen Delivery Method): room air      CONSTITUTIONAL: NAD, obese, well-groomed  EYES: PERRLA; conjunctiva and sclera clear  ENMT: Moist oral mucosa, no pharyngeal injection or exudates; normal dentition  NECK: Supple, no palpable masses; no thyromegaly  RESPIRATORY: Normal respiratory effort; lungs are clear to auscultation bilaterally  CARDIOVASCULAR: Regular rate and rhythm, normal S1 and S2, no murmur/rub/gallop; No lower extremity edema; Peripheral pulses are 2+ bilaterally  ABDOMEN: Nontender to palpation, normoactive bowel sounds, no rebound/guarding; No hepatosplenomegaly  MUSCULOSKELETAL: no clubbing or cyanosis of digits; no joint swelling or tenderness to palpation  PSYCH: affect flat; nonverbal  NEUROLOGY: awake, tracking movements, moves upper extremities spontaneously, not cooperative with rest of neurologic exam  SKIN: No rashes; no palpable lesions

## 2024-02-11 NOTE — H&P ADULT - PROBLEM SELECTOR PLAN 5
Pt. resting quietly, family in room, breathing tx in process, pt. Wwthout complaint at this time. - resume home antihypertensives – lisinopril 20mg po daily

## 2024-02-11 NOTE — ED ADULT NURSE NOTE - NSFALLUNIVINTERV_ED_ALL_ED
Bed/Stretcher in lowest position, wheels locked, appropriate side rails in place/Call bell, personal items and telephone in reach/Instruct patient to call for assistance before getting out of bed/chair/stretcher/Non-slip footwear applied when patient is off stretcher/[place] to call system/Physically safe environment - no spills, clutter or unnecessary equipment/Purposeful proactive rounding/Room/bathroom lighting operational, light cord in reach

## 2024-02-11 NOTE — H&P ADULT - HISTORY OF PRESENT ILLNESS
Patient is a 27yo F with PMH significant for obesity, HTN, and DM who presented from group home after multiple episodes of emesis of gastric contents on day of presentation. The patient is non-verbal and unable to offer complaints or ROS. I spoke to the manager at the group home who corroborated her medical history. She also stated she has been having some strange behavior recently, though has given no indications she was in pain. She gave me the contact information for her brother, Shahbaz Seth [phone]. I spoke to him and was able to gather some additional history from him – she is ambulatory at baseline with no noted difficulty breathing. She has had surgery once a long time ago for skin grafting. No family history of excessive bleeding or clotting, or of reactions to anesthesia. She does not use insulin for diabetic control.     Vital signs significant for: afebrile, HR 80-90s bpm, normotensive, RR 16-20, SpO2 100% on RA  Labs significant for: WBC 12.01, MCV 65.1, CO2 17, AG 18, , , . UA: cloudy, 15 ketones, small bilirubin, small LE, 2 WBCs, 52 RBCs, mod bacteria, 8 epis  Imaging significant for:   - RUQ US: cholelithiasis with associated gallbladder wall thickening  - CTAP: mild gallbladder wall edema; mild circumferential urinary bladder wall thickening compatible with cystitis  - HIDA scan: significantly delayed gallbladder filling may be see with acute/subacute cholecystitis

## 2024-02-11 NOTE — H&P ADULT - PROBLEM SELECTOR PLAN 6
- continue home meds: divalproex ER 500mg po BID, folate, MVI  - clarify indications particularly for desmopressin 0.2mg po qhs (held for now), divalproex ER 500mg po BID, and Seroquel 150mg po BID

## 2024-02-11 NOTE — H&P ADULT - ASSESSMENT
Patient is a 27yo F with PMH significant for obesity, HTN, and DM who presented from [facility] home after multiple episodes of emesis of gastric contents on day of presentation, admitted with suspected cholecystitis.

## 2024-02-11 NOTE — ED ADULT NURSE NOTE - CHIEF COMPLAINT QUOTE
Pt arrives from group home, nonverbal at baseline, as per EMS pt has been vomiting throughout the day, no diarrhea. Past medical history of intellectual disabilities, DM type 2, non-insulin dependent.

## 2024-02-11 NOTE — CONSULT NOTE ADULT - SUBJECTIVE AND OBJECTIVE BOX
Surgery Consult Note  Attending: Dayan  Service: B Team  [id number]      HPI: 28y Female PMH obesity, HTN, DM presents after having emesis x4 at her group home today after eating lunch. Patient is severely developmentally delayed and unable to give any medical history. At bedside is a health aid who said she threw up after eating mac and cheese but did not appear to have any other symptoms. Normal bowel movents and no issue with urination. Patient does not appear to be in any discomfort. In the Ed patient HDS, afebrile with WBC 12 and transaminitis with normal tbili. RUQ shows stones and some GB wall thickening but no other concerning features. General surgery consulted for r/o cholecystitis.     Of note, a surrogate decision maker has not been identified by the health aid, multiple attempts were made to call her group home ([facility] in [place] or her nurse taking care of her but no response was achieved as to who is the patients HCP or surrogate decision maker,       PAST MEDICAL HISTORY:  PAST MEDICAL & SURGICAL HISTORY:  Intellectual disability      Diabetes      Hypertension      Obesity      No significant past surgical history          ALLERGIES:  Allergies    Haldol (Other)  Abilify (Other)  risperidone (Other)    Intolerances        SOCIAL HISTORY: Negative for tobacco, etoh, or drug use    FAMILY HISTORY:  FAMILY HISTORY:      PHYSICAL EXAM:  General: NAD, resting comfortably  HEENT: NC/AT, EOMI, normal hearing, no oral lesions, no LAD, neck supple  Pulmonary: normal resp effort, CTA-B  Cardiovascular: NSR, no murmurs  Abdominal: soft, ND/NT, no organomegaly, no guarding or rebound tenderness  Extremities: WWP, normal strength, no clubbing/cyanosis/edema  Neuro: A/O x 3, CNs II-XII grossly intact, normal sensation, no focal deficits    VITAL SIGNS:  Vital Signs Last 24 Hrs  T(C): 36.8 (2024 04:25), Max: 36.8 (2024 04:25)  T(F): 98.3 (2024 04:25), Max: 98.3 (2024 04:25)  HR: 85 (2024 04:25) (85 - 96)  BP: 108/64 (2024 04:25) (108/64 - 111/75)  BP(mean): --  RR: 16 (2024 04:25) (16 - 16)  SpO2: 100% (2024 04:25) (100% - 100%)    Parameters below as of 2024 04:25  Patient On (Oxygen Delivery Method): room air        I&O's Summary      LABS:                        11   12.01 )-----------( 347      ( 2024 00:34 )             36.8         136  |  101  |  11  ----------------------------<  106<H>  5.0   |  17<L>  |  0.59    Ca    9.5      2024 00:34    TPro  7.6  /  Alb  4.0  /  TBili  0.9  /  DBili  x   /  AST  452<H>  /  ALT  343<H>  /  AlkPhos  163<H>        Urinalysis Basic - ( 2024 00:35 )    Color: Dark Yellow / Appearance: Cloudy / S.018 / pH: x  Gluc: x / Ketone: 15 mg/dL  / Bili: Small / Urobili: 2.0 mg/dL   Blood: x / Protein: Trace mg/dL / Nitrite: Negative   Leuk Esterase: Small / RBC: 52 /HPF / WBC 2 /HPF   Sq Epi: x / Non Sq Epi: 8 /HPF / Bacteria: Moderate /HPF      CAPILLARY BLOOD GLUCOSE      POCT Blood Glucose.: 120 mg/dL (10 Feb 2024 23:17)    LIVER FUNCTIONS - ( 2024 00:34 )  Alb: 4.0 g/dL / Pro: 7.6 g/dL / ALK PHOS: 163 U/L / ALT: 343 U/L / AST: 452 U/L / GGT: x             CULTURES:      RADIOLOGY & ADDITIONAL STUDIES:  < from: US Abdomen Upper Quadrant Right (24 @ 03:51) >  ACC: [id number] EXAM:  US ABDOMEN RT UPR QUADRANT   ORDERED BY: GAVIN OLMOS     PROCEDURE DATE:  2024          INTERPRETATION:  CLINICAL INFORMATION: Vomiting. Elevated transaminases.    COMPARISON: None available.    TECHNIQUE: Sonography of the right upper quadrant.    FINDINGS:  Liver: Within normal limits.  Bile ducts: Normal caliber. Common bile duct measures 5 mm.  Gallbladder: Cholelithiasis. Mural thickening, measuring up to 5 mm. No   pericholecystic fluid. Negative sonographic Maxwell's sign.  Pancreas: Poorly visualized.  Right kidney: 10.4 cm. No hydronephrosis.  Ascites: None.  IVC: Visualized portions are within normal limits.    IMPRESSION:  Cholelithiasis with associated gallbladder wall thickening but reportedly   negative sonographic Maxwell sign. HIDA scan can be considered for further   evaluation.    --- End of Report ---    < end of copied text >

## 2024-02-11 NOTE — H&P ADULT - TIME BILLING
Time-based billing (NON-critical care).     75 minutes spent on total encounter; more than 50% of the visit was spent counseling and / or coordinating care by the attending physician.  The necessity of the time spent during the encounter on this date of service was due to:     review of laboratory data, radiology results, consultants' recommendations, documentation in [place], discussion with patient/group home staff/brother

## 2024-02-11 NOTE — ED PROVIDER NOTE - PHYSICAL EXAMINATION
T(C): 36.6 (02-10-24 @ 23:13), Max: 36.6 (02-10-24 @ 23:13)  HR: 96 (02-10-24 @ 23:13) (96 - 96)  BP: 111/75 (02-10-24 @ 23:13) (111/75 - 111/75)  RR: 16 (02-10-24 @ 23:13) (16 - 16)  SpO2: 100% (02-10-24 @ 23:13) (100% - 100%)    CONSTITUTIONAL: limited speech, does not appear toxically sick  RESP: No respiratory distress, no use of accessory muscles; CTA b/l, no WRR  CV: RRR, +S1S2, no MRG  GI: Soft, NT, ND, no rebound, no guarding   : no CVA tenderness elicited  PSYCH: poverty of speech, responds no when asked about sxs General: Appears well and nontoxic  Mentation: AAO x 1  psych: mood appropriate  HEENT: airway patent, conjunctivae clear bilaterally  Resp: symmetric chest rise, no resp distress, breath sounds CTA bilaterally  Cardio: RRR, no m/r/g  GI: soft/nondistended/nontender  : no CVA tenderness  Neuro: sensation and motor function grossly intact  Skin: no cyanosis, no jaundice  MSK: normal movement of all extremities  Lymph/Vasc: no LE edema

## 2024-02-11 NOTE — ED PROVIDER NOTE - CLINICAL SUMMARY MEDICAL DECISION MAKING FREE TEXT BOX
27 y/o female PMHx severe intellectual disability, obesity, HTN, diabetes presenting w 4 episodes of emesis began earlier today after consuming mac and cheese, accompanied by  who provided most hx, no infectious sxs, headache, urinary pain, diarrhea, VSS, most likely diagnosis is viral gastroenteritis vs food poisoning     - CBC, CMP  - urinalysis  - monitor for improvement

## 2024-02-12 DIAGNOSIS — E16.2 HYPOGLYCEMIA, UNSPECIFIED: ICD-10-CM

## 2024-02-12 LAB
A1C WITH ESTIMATED AVERAGE GLUCOSE RESULT: 5.7 % — HIGH (ref 4–5.6)
ALBUMIN SERPL ELPH-MCNC: 3.7 G/DL — SIGNIFICANT CHANGE UP (ref 3.3–5)
ALP SERPL-CCNC: 226 U/L — HIGH (ref 40–120)
ALT FLD-CCNC: 424 U/L — HIGH (ref 4–33)
ANION GAP SERPL CALC-SCNC: 22 MMOL/L — HIGH (ref 7–14)
ANISOCYTOSIS BLD QL: SLIGHT — SIGNIFICANT CHANGE UP
AST SERPL-CCNC: 284 U/L — HIGH (ref 4–32)
BASOPHILS # BLD AUTO: 0.05 K/UL — SIGNIFICANT CHANGE UP (ref 0–0.2)
BASOPHILS NFR BLD AUTO: 0.9 % — SIGNIFICANT CHANGE UP (ref 0–2)
BILIRUB SERPL-MCNC: 0.7 MG/DL — SIGNIFICANT CHANGE UP (ref 0.2–1.2)
BUN SERPL-MCNC: 12 MG/DL — SIGNIFICANT CHANGE UP (ref 7–23)
CALCIUM SERPL-MCNC: 8.7 MG/DL — SIGNIFICANT CHANGE UP (ref 8.4–10.5)
CHLORIDE SERPL-SCNC: 92 MMOL/L — LOW (ref 98–107)
CO2 SERPL-SCNC: 21 MMOL/L — LOW (ref 22–31)
CREAT SERPL-MCNC: 0.69 MG/DL — SIGNIFICANT CHANGE UP (ref 0.5–1.3)
EGFR: 121 ML/MIN/1.73M2 — SIGNIFICANT CHANGE UP
EOSINOPHIL # BLD AUTO: 0.05 K/UL — SIGNIFICANT CHANGE UP (ref 0–0.5)
EOSINOPHIL NFR BLD AUTO: 0.9 % — SIGNIFICANT CHANGE UP (ref 0–6)
ESTIMATED AVERAGE GLUCOSE: 117 — SIGNIFICANT CHANGE UP
GLUCOSE BLDC GLUCOMTR-MCNC: 87 MG/DL — SIGNIFICANT CHANGE UP (ref 70–99)
GLUCOSE BLDC GLUCOMTR-MCNC: 96 MG/DL — SIGNIFICANT CHANGE UP (ref 70–99)
GLUCOSE SERPL-MCNC: 343 MG/DL — HIGH (ref 70–99)
HCT VFR BLD CALC: 34.8 % — SIGNIFICANT CHANGE UP (ref 34.5–45)
HGB BLD-MCNC: 10.8 G/DL — LOW (ref 11.5–15.5)
IANC: 2.99 K/UL — SIGNIFICANT CHANGE UP (ref 1.8–7.4)
LYMPHOCYTES # BLD AUTO: 1.42 K/UL — SIGNIFICANT CHANGE UP (ref 1–3.3)
LYMPHOCYTES # BLD AUTO: 28.2 % — SIGNIFICANT CHANGE UP (ref 13–44)
MANUAL SMEAR VERIFICATION: SIGNIFICANT CHANGE UP
MCHC RBC-ENTMCNC: 20.1 PG — LOW (ref 27–34)
MCHC RBC-ENTMCNC: 31 GM/DL — LOW (ref 32–36)
MCV RBC AUTO: 64.9 FL — LOW (ref 80–100)
METAMYELOCYTES # FLD: 3.6 % — HIGH (ref 0–1)
MICROCYTES BLD QL: SLIGHT — SIGNIFICANT CHANGE UP
MONOCYTES # BLD AUTO: 0.19 K/UL — SIGNIFICANT CHANGE UP (ref 0–0.9)
MONOCYTES NFR BLD AUTO: 3.7 % — SIGNIFICANT CHANGE UP (ref 2–14)
NEUTROPHILS # BLD AUTO: 2.62 K/UL — SIGNIFICANT CHANGE UP (ref 1.8–7.4)
NEUTROPHILS NFR BLD AUTO: 51.8 % — SIGNIFICANT CHANGE UP (ref 43–77)
OVALOCYTES BLD QL SMEAR: SLIGHT — SIGNIFICANT CHANGE UP
PLAT MORPH BLD: NORMAL — SIGNIFICANT CHANGE UP
PLATELET # BLD AUTO: 304 K/UL — SIGNIFICANT CHANGE UP (ref 150–400)
PLATELET COUNT - ESTIMATE: NORMAL — SIGNIFICANT CHANGE UP
POIKILOCYTOSIS BLD QL AUTO: SLIGHT — SIGNIFICANT CHANGE UP
POLYCHROMASIA BLD QL SMEAR: SLIGHT — SIGNIFICANT CHANGE UP
POTASSIUM SERPL-MCNC: 3.9 MMOL/L — SIGNIFICANT CHANGE UP (ref 3.5–5.3)
POTASSIUM SERPL-SCNC: 3.9 MMOL/L — SIGNIFICANT CHANGE UP (ref 3.5–5.3)
PROT SERPL-MCNC: 7.6 G/DL — SIGNIFICANT CHANGE UP (ref 6–8.3)
RBC # BLD: 5.36 M/UL — HIGH (ref 3.8–5.2)
RBC # FLD: 16.9 % — HIGH (ref 10.3–14.5)
RBC BLD AUTO: ABNORMAL
SODIUM SERPL-SCNC: 135 MMOL/L — SIGNIFICANT CHANGE UP (ref 135–145)
VARIANT LYMPHS # BLD: 10.9 % — HIGH (ref 0–6)
WBC # BLD: 5.05 K/UL — SIGNIFICANT CHANGE UP (ref 3.8–10.5)
WBC # FLD AUTO: 5.05 K/UL — SIGNIFICANT CHANGE UP (ref 3.8–10.5)

## 2024-02-12 PROCEDURE — 99233 SBSQ HOSP IP/OBS HIGH 50: CPT

## 2024-02-12 RX ORDER — SODIUM CHLORIDE 9 MG/ML
1000 INJECTION, SOLUTION INTRAVENOUS
Refills: 0 | Status: DISCONTINUED | OUTPATIENT
Start: 2024-02-12 | End: 2024-02-14

## 2024-02-12 RX ORDER — SODIUM CHLORIDE 9 MG/ML
1000 INJECTION, SOLUTION INTRAVENOUS
Refills: 0 | Status: DISCONTINUED | OUTPATIENT
Start: 2024-02-12 | End: 2024-02-16

## 2024-02-12 RX ORDER — DEXTROSE 50 % IN WATER 50 %
12.5 SYRINGE (ML) INTRAVENOUS ONCE
Refills: 0 | Status: COMPLETED | OUTPATIENT
Start: 2024-02-12 | End: 2024-02-12

## 2024-02-12 RX ADMIN — PIPERACILLIN AND TAZOBACTAM 25 GRAM(S): 4; .5 INJECTION, POWDER, LYOPHILIZED, FOR SOLUTION INTRAVENOUS at 01:15

## 2024-02-12 RX ADMIN — PIPERACILLIN AND TAZOBACTAM 25 GRAM(S): 4; .5 INJECTION, POWDER, LYOPHILIZED, FOR SOLUTION INTRAVENOUS at 09:19

## 2024-02-12 RX ADMIN — Medication 12.5 GRAM(S): at 09:12

## 2024-02-12 RX ADMIN — DIVALPROEX SODIUM 500 MILLIGRAM(S): 500 TABLET, DELAYED RELEASE ORAL at 05:21

## 2024-02-12 RX ADMIN — DIVALPROEX SODIUM 500 MILLIGRAM(S): 500 TABLET, DELAYED RELEASE ORAL at 17:17

## 2024-02-12 RX ADMIN — SODIUM CHLORIDE 100 MILLILITER(S): 9 INJECTION, SOLUTION INTRAVENOUS at 09:50

## 2024-02-12 RX ADMIN — FAMOTIDINE 40 MILLIGRAM(S): 10 INJECTION INTRAVENOUS at 13:53

## 2024-02-12 RX ADMIN — PIPERACILLIN AND TAZOBACTAM 25 GRAM(S): 4; .5 INJECTION, POWDER, LYOPHILIZED, FOR SOLUTION INTRAVENOUS at 13:54

## 2024-02-12 RX ADMIN — PIPERACILLIN AND TAZOBACTAM 25 GRAM(S): 4; .5 INJECTION, POWDER, LYOPHILIZED, FOR SOLUTION INTRAVENOUS at 22:13

## 2024-02-12 NOTE — PROGRESS NOTE ADULT - PROBLEM SELECTOR PLAN 5
- resume home antihypertensives – lisinopril 20mg po daily - UA: cloudy, 15 ketones, small bilirubin, small LE, 2 WBCs, 52 RBCs, mod bacteria, 8 epis  - CTAP: mild circumferential urinary bladder wall thickening compatible with cystitis  - obtain UCx  - empirically covered with zosyn

## 2024-02-12 NOTE — PROVIDER CONTACT NOTE (HYPOGLYCEMIA EVENT) - NS PROVIDER CONTACT BACKGROUND-HYPO
Age: 28y    Gender: Female    POCT Blood Glucose:  151 mg/dL (02-12-24 @ 09:29)  59 mg/dL (02-12-24 @ 09:11)  60 mg/dL (02-12-24 @ 09:09)  67 mg/dL (02-12-24 @ 08:56)  82 mg/dL (02-12-24 @ 06:30)  75 mg/dL (02-12-24 @ 04:10)  71 mg/dL (02-12-24 @ 01:09)  90 mg/dL (02-11-24 @ 18:40)      eMAR:  dextrose 50% Injectable   12.5 Gram(s) IV Push (02-12-24 @ 09:12)

## 2024-02-12 NOTE — PROGRESS NOTE ADULT - SUBJECTIVE AND OBJECTIVE BOX
SURGERY PROGRESS NOTE    Interval events  - No acute events.  - Vital signs stable, afebrile, on room air.   - Remains in ED.  - HIDA with delayed filling  - UA with bacteria, LE+      OBJECTIVE:   Vital Signs Last 24 Hrs  T(C): 37.3 (12 Feb 2024 10:24), Max: 37.3 (12 Feb 2024 10:24)  T(F): 99.2 (12 Feb 2024 10:24), Max: 99.2 (12 Feb 2024 10:24)  HR: 90 (12 Feb 2024 10:24) (73 - 106)  BP: 116/88 (12 Feb 2024 10:24) (83/61 - 122/83)  BP(mean): --  RR: 18 (12 Feb 2024 10:24) (16 - 20)  SpO2: 98% (12 Feb 2024 10:24) (97% - 100%)    Parameters below as of 12 Feb 2024 10:24  Patient On (Oxygen Delivery Method): room air            I&O's Summary    I&O's Detail      MEDICATIONS  (STANDING):  dextrose 5% + sodium chloride 0.9%. 1000 milliLiter(s) (80 mL/Hr) IV Continuous <Continuous>  dextrose 5%. 1000 milliLiter(s) (50 mL/Hr) IV Continuous <Continuous>  dextrose 5%. 1000 milliLiter(s) (100 mL/Hr) IV Continuous <Continuous>  dextrose 5%. 1000 milliLiter(s) (100 mL/Hr) IV Continuous <Continuous>  dextrose 50% Injectable 12.5 Gram(s) IV Push once  dextrose 50% Injectable 25 Gram(s) IV Push once  dextrose 50% Injectable 25 Gram(s) IV Push once  divalproex  milliGRAM(s) Oral two times a day  famotidine    Tablet 40 milliGRAM(s) Oral daily  glucagon  Injectable 1 milliGRAM(s) IntraMuscular once  insulin lispro (ADMELOG) corrective regimen sliding scale   SubCutaneous every 6 hours  lisinopril 20 milliGRAM(s) Oral daily  piperacillin/tazobactam IVPB.. 3.375 Gram(s) IV Intermittent every 8 hours    MEDICATIONS  (PRN):  acetaminophen     Tablet .. 650 milliGRAM(s) Oral every 6 hours PRN Temp greater or equal to 38C (100.4F), Mild Pain (1 - 3)  dextrose Oral Gel 15 Gram(s) Oral once PRN Blood Glucose LESS THAN 70 milliGRAM(s)/deciliter  melatonin 3 milliGRAM(s) Oral at bedtime PRN Insomnia      LABS:                        10.8   5.05  )-----------( 304      ( 12 Feb 2024 06:00 )             34.8     02-12    135  |  92<L>  |  12  ----------------------------<  343<H>  3.9   |  21<L>  |  0.69    Ca    8.7      12 Feb 2024 06:00    TPro  7.6  /  Alb  3.7  /  TBili  0.7  /  DBili  x   /  AST  284<H>  /  ALT  424<H>  /  AlkPhos  226<H>  02-12      Urinalysis Basic - ( 12 Feb 2024 06:00 )    Color: x / Appearance: x / SG: x / pH: x  Gluc: 343 mg/dL / Ketone: x  / Bili: x / Urobili: x   Blood: x / Protein: x / Nitrite: x   Leuk Esterase: x / RBC: x / WBC x   Sq Epi: x / Non Sq Epi: x / Bacteria: x        RADIOLOGY & ADDITIONAL STUDIES:  < from: NM Hepatobiliary Imaging (02.11.24 @ 12:34) >    ACC: [id number] EXAM:  NM HEPATOBILIARY IMG   ORDERED BY: LAMONTE GILBERT     PROCEDURE DATE:  02/11/2024          INTERPRETATION:  CLINICAL INFORMATION: Elevated LFT's    DURATION of DYNAMIC SERIES: 120 mn    RADIOPHARMACEUTICAL: 3.1 mCi Tc-99m-Mebrofenin, I.V.    PHARMACOLOGIC INTERVENTION: Morphine 4 mg slow IVP over 2 mn as per   protocol    TECHNIQUE: Dynamic imaging of the anterior abdomen was performed   following radiopharmaceutical injection. Static images of the abdomen in   the anterior, right anterior oblique, and right lateral views were   obtained immediately thereafter.    COMPARISON: None    OTHER STUDIES USED FOR CORRELATION: Ultrasound of the abdomen, CT abdomen   pelvis 2/11/2024.    FINDINGS: There is prompt, homogeneous uptake of radiopharmaceutical by   the hepatocytes. Activity is first seen in the bowel at about 50 minutes   with mild bowel clearance within the second hour of imaging. There is   retention of activity in the liver at the end of the study most   suggestive of underlying liver dysfunction. Bladder activity is also   noted most supportive of underlying liver dysfunction.    The gallbladder is visualized late 60 minutes after morphine   administration.    IMPRESSION: Abnormal hepatobiliary scan.    Significantly delayed gallbladder filling may be seen with acute/subacute   cholecystitis although underlying liver dysfunction is a confounding   factor. Further correlation as clinically indicated.    --- End of Report ---            RASHAD CABAN MD; Attending Nuclear Medicine  This document has been electronically signed. Feb 11 2024  1:08PM    < end of copied text >

## 2024-02-12 NOTE — PROGRESS NOTE ADULT - PROBLEM SELECTOR PLAN 6
- continue home meds: divalproex ER 500mg po BID, folate, MVI  - clarify indications particularly for desmopressin 0.2mg po qhs (held for now), divalproex ER 500mg po BID, and Seroquel 150mg po BID - resume home antihypertensives – lisinopril 20mg po daily

## 2024-02-12 NOTE — PROGRESS NOTE ADULT - ASSESSMENT
28 yof PMH HTN,DM, obesity and severe developmental delay presents from her group home for emesis x4 NBNB with her health aid. Unable to give any ROS/history but home health aid reports no other concerning symptoms noticed except emesis. Exam without any abdominal tenderness, bloating or distention. Labs  on admit with mild leukocytosis to 12 and isolate transaminitis and elevated alk phos.       Plan:  - No leukocytosis today with benign exam. HIDA with delayed filling. Has increased LFTs with evidence of hepatic dysfunction on HIDA.   - No acute surgical intervention, low concern for acute cholecystitis at this time. Consider Hepatitis, less likely to have cholecystitis (may have dyskinesia).  - Antibiotics for UTI  - Surgery team will sign off at this time. Please call with any further questions/concerns.      B Team   [id number]

## 2024-02-12 NOTE — PROGRESS NOTE ADULT - PROBLEM SELECTOR PLAN 3
- home regimen: metformin 1g po BID  - f/s qachs – goal 140-180 while admitted; ISS qachs for now - patient is planned potentially for laparoscopic cholecystectomy  - baseline mets >4 per staff and family  - no personal or family reactions to anesthesia, or excessive bleeding/clotting  - ECG: NSR, no ischemic ST-T changes  - no absolute contraindications such as acute coronary syndrome, decompensated heart failure, or malignant arrhythmia  - Cr <2, T2DM w/o long-term use of insulin, no hx of stroke, CAD, of CHF  - RCRI of 0 confers a 0.4% estimated rate of myocardial infarction, pulmonary edema, ventricular fibrillation, cardiac arrest, or complete heart block (Garland 1999) or a 3.9% 30-day risk of death, MI, or cardiac arrest (Neto 2017)  - patient is low risk for an intermediate risk procedure; no further cardiac testing is required at this time and she may proceed with the planned procedure

## 2024-02-12 NOTE — PROGRESS NOTE ADULT - PROBLEM SELECTOR PLAN 1
#Suspected cholecystitis  - RUQ US: cholelithiasis with associated gallbladder wall thickening  - CTAP: mild gallbladder wall edema  - HIDA scan: significantly delayed gallbladder filling may be see with acute/subacute cholecystitis  -  and bilirubin noted in urine, ,   - trend LFTs  - empirically covered with zosyn  - avoid hepatotoxins - surgery evaluation appreciated – no acute surgical intervention as suspicion low for acute cholecystitis (prior to HIDA scan)  - appreciate surgery input #Suspected cholecystitis  - RUQ US: cholelithiasis with associated gallbladder wall thickening  - CTAP: mild gallbladder wall edema  - HIDA scan: significantly delayed gallbladder filling may be see with acute/subacute cholecystitis  -  and bilirubin noted in urine, ,   - trend LFTs  - empirically covered with zosyn  - avoid hepatotoxins - surgery evaluation appreciated – no acute surgical intervention as suspicion low for acute cholecystitis (prior to HIDA scan)  - appreciate surgery input--> OR today. Keep NPO

## 2024-02-12 NOTE — PROGRESS NOTE ADULT - PROBLEM SELECTOR PLAN 8
#VTE PPx: SCDs since she may go to OR  #GI: famotidine  #Diet: NPO for possible OR  #Dispo: admit to medicine. F/u surgery.    Prior MD--> Discussed with  and patient's brother Shahbaz Seth [phone]

## 2024-02-12 NOTE — PROGRESS NOTE ADULT - PROBLEM SELECTOR PLAN 2
- patient is planned potentially for laparoscopic cholecystectomy  - baseline mets >4 per staff and family  - no personal or family reactions to anesthesia, or excessive bleeding/clotting  - ECG: NSR, no ischemic ST-T changes  - no absolute contraindications such as acute coronary syndrome, decompensated heart failure, or malignant arrhythmia  - Cr <2, T2DM w/o long-term use of insulin, no hx of stroke, CAD, of CHF  - RCRI of 0 confers a 0.4% estimated rate of myocardial infarction, pulmonary edema, ventricular fibrillation, cardiac arrest, or complete heart block (Garland 1999) or a 3.9% 30-day risk of death, MI, or cardiac arrest (Neto 2017)  - patient is low risk for an intermediate risk procedure; no further cardiac testing is required at this time and she may proceed with the planned procedure Glucose 59 s/p D50 now 151.  D5NS IVF at 80 cc/hr.  Patient is alert and talking today 2/12/24  Again optimized for OR

## 2024-02-12 NOTE — PATIENT PROFILE ADULT - FALL HARM RISK - HARM RISK INTERVENTIONS

## 2024-02-12 NOTE — ED ADULT NURSE REASSESSMENT NOTE - NS ED NURSE REASSESS COMMENT FT1
Patient received from night RN Concepcion at 0820. Pt remains at baseline mental, awake and appears to be resting comfortably in stretcher. Respirations even and unlabored on room air. VS as charted. Pt found to be hypoglycemic as documented in hypoglycemia event documentation. ACP Provider Cyndie Singh made aware. pt NPO, pt alert and asymptomatic. Medications administered as ordered as per eMAR.
Pt resting in stretcher, at baseline orientation status, offers no complaints of pain or discomfort at this time. Medicated as per EMAR, AM labs drawn and sent, RR equal and unlabored, VSS at this time, safety maintained, comfort provided, awaiting bed placement at this time.
Received pt from Sandra RN. pt sitting quietly in stretcher NAD noted a this time. Pt admitted awaiting bed placement. No new orders a this time pt safety ongoing.
Report received from DANIE Davila. Pt resting in stretcher, non-verbal consistent with baseline, no complaints of pain or discomfort at this time, FS 71, medicated as per EMAR, RR equal and unlabored, safety maintained, comfort provided, awaiting bed placement at this time.
pt sitting quietly in stretcher NAD noted a this time. Pt admitted awaiting admission bed. NSR on CM. No new orders a this time pt safety ongoing.
Received patient from previous RN, A&O X 0, nonverbal at baseline, documentation as noted. No nonverbal signs and symptoms of pain noted at this time . Patient unable to speak in clear sentences, respirations equal and unlabored on RA. Patient pending bed placement. Patient in no acute distress at this time, group home aide at bedside, will continue to monitor. VSS as noted in flowsheet.
Pt just returned from US, a&ox4, no signs of CP or SOB. Respirations are even and unlabored, no signs of respiratory distress.

## 2024-02-12 NOTE — PROGRESS NOTE ADULT - ASSESSMENT
Patient is a 29yo F with PMH significant for obesity, HTN, and DM who presented from [facility] home after multiple episodes of emesis of gastric contents on day of presentation, admitted with suspected cholecystitis. Patient is a 27yo F with PMH significant for obesity, HTN, and DM who presented from group home after multiple episodes of emesis of gastric contents on day of presentation, admitted with suspected cholecystitis.    rad< from: NM Hepatobiliary Imaging (02.11.24 @ 12:34) >  IMPRESSION: Abnormal hepatobiliary scan.  Significantly delayed gallbladder filling may be seen with acute/subacute   cholecystitis although underlying liver dysfunction is a confounding   factor. Further correlation as clinically indicated.      < from: CT Abdomen and Pelvis w/ IV Cont (02.11.24 @ 06:04) >  IMPRESSION:  Mild gallbladder wall edema. Consider HIDA scan for further evaluation.  Mild circumferential urinary bladder wallthickening, compatible with   cystitis. Recommend correlation with urinalysis.      < from: US Abdomen Upper Quadrant Right (02.11.24 @ 03:51) >  IMPRESSION:  Cholelithiasis with associated gallbladder wall thickening but reportedly   negative sonographic Maxwell sign. HIDA scan can be considered for further   evaluation.

## 2024-02-12 NOTE — PROGRESS NOTE ADULT - PROBLEM SELECTOR PLAN 7
#VTE PPx: SCDs since she may go to OR  #GI: famotidine  #Diet: NPO for possible OR  #Dispo: admit to medicine. F/u surgery.    Prior MD--> Discussed with  and patient's brother Shahbaz Seth [phone] - continue home meds: divalproex ER 500mg po BID, folate, MVI  - clarify indications particularly for desmopressin 0.2mg po qhs (held for now), divalproex ER 500mg po BID, and Seroquel 150mg po BID

## 2024-02-12 NOTE — PROGRESS NOTE ADULT - SUBJECTIVE AND OBJECTIVE BOX
Patient is a 28y old  Female who presents with a chief complaint of cholecystitis (12 Feb 2024 09:23)      SUBJECTIVE / OVERNIGHT EVENTS:  Patient alert and talking.  Asking for juice and eggs.  Notes she has" pain all" over when abdomen palpated. But otherwise smiles  Seen with RN who noted she is to go to OR today but had Low glucose and got D50 and now will get d5NS  RN will do f/u FS    MEDICATIONS  (STANDING):  dextrose 5% + sodium chloride 0.9%. 1000 milliLiter(s) (80 mL/Hr) IV Continuous <Continuous>  dextrose 5%. 1000 milliLiter(s) (100 mL/Hr) IV Continuous <Continuous>  dextrose 5%. 1000 milliLiter(s) (50 mL/Hr) IV Continuous <Continuous>  dextrose 5%. 1000 milliLiter(s) (100 mL/Hr) IV Continuous <Continuous>  dextrose 50% Injectable 25 Gram(s) IV Push once  dextrose 50% Injectable 25 Gram(s) IV Push once  dextrose 50% Injectable 12.5 Gram(s) IV Push once  divalproex  milliGRAM(s) Oral two times a day  famotidine    Tablet 40 milliGRAM(s) Oral daily  glucagon  Injectable 1 milliGRAM(s) IntraMuscular once  insulin lispro (ADMELOG) corrective regimen sliding scale   SubCutaneous every 6 hours  lisinopril 20 milliGRAM(s) Oral daily  piperacillin/tazobactam IVPB.. 3.375 Gram(s) IV Intermittent every 8 hours    MEDICATIONS  (PRN):  acetaminophen     Tablet .. 650 milliGRAM(s) Oral every 6 hours PRN Temp greater or equal to 38C (100.4F), Mild Pain (1 - 3)  dextrose Oral Gel 15 Gram(s) Oral once PRN Blood Glucose LESS THAN 70 milliGRAM(s)/deciliter  melatonin 3 milliGRAM(s) Oral at bedtime PRN Insomnia        CAPILLARY BLOOD GLUCOSE      POCT Blood Glucose.: 151 mg/dL (12 Feb 2024 09:29)  POCT Blood Glucose.: 59 mg/dL (12 Feb 2024 09:11)  POCT Blood Glucose.: 60 mg/dL (12 Feb 2024 09:09)  POCT Blood Glucose.: 67 mg/dL (12 Feb 2024 08:56)  POCT Blood Glucose.: 82 mg/dL (12 Feb 2024 06:30)  POCT Blood Glucose.: 75 mg/dL (12 Feb 2024 04:10)  POCT Blood Glucose.: 71 mg/dL (12 Feb 2024 01:09)  POCT Blood Glucose.: 90 mg/dL (11 Feb 2024 18:40)  POCT Blood Glucose.: 90 mg/dL (11 Feb 2024 13:27)    I&O's Summary      PHYSICAL EXAM:  GENERAL: NAD,   HEAD:  Atraumatic, Normocephalic  EYES: EOMI, PERRLA, conjunctiva and sclera clear  NECK: Supple, No JVD  CHEST/LUNG: Clear to auscultation bilaterally; No wheeze  HEART: Regular rate and rhythm; No murmurs, rubs, or gallops  ABDOMEN: Soft, ???Nontender, Negative [place] sign  Nondistended; Bowel sounds present  Patient stated she had pain on palpation to all 4 quadrant , but did not look in distress.  EXTREMITIES:  2+ Peripheral Pulses, No clubbing, cyanosis, or edema  PSYCH: Alert  NEUROLOGY: non-focal  SKIN: No rashes or lesions    LABS:                        10.8   5.05  )-----------( 304      ( 12 Feb 2024 06:00 )             34.8     02-12    135  |  92<L>  |  12  ----------------------------<  343<H>  3.9   |  21<L>  |  0.69    Ca    8.7      12 Feb 2024 06:00    TPro  7.6  /  Alb  3.7  /  TBili  0.7  /  DBili  x   /  AST  284<H>  /  ALT  424<H>  /  AlkPhos  226<H>  02-12        Care Discussed with Consultants/Other Providers:  RN, planning for OR today   Patient is a 28y old  Female who presents with a chief complaint of cholecystitis (12 Feb 2024 09:23)      SUBJECTIVE / OVERNIGHT EVENTS:  Patient alert and talking.  Asking for juice and eggs.  Notes she has" pain all" over when abdomen palpated. But otherwise smiles  Seen with RN who noted she is to go to OR today but had Low glucose and got D50 and now will get d5NS  RN will do f/u FS    MEDICATIONS  (STANDING):  dextrose 5% + sodium chloride 0.9%. 1000 milliLiter(s) (80 mL/Hr) IV Continuous <Continuous>  dextrose 5%. 1000 milliLiter(s) (100 mL/Hr) IV Continuous <Continuous>  dextrose 5%. 1000 milliLiter(s) (50 mL/Hr) IV Continuous <Continuous>  dextrose 5%. 1000 milliLiter(s) (100 mL/Hr) IV Continuous <Continuous>  dextrose 50% Injectable 25 Gram(s) IV Push once  dextrose 50% Injectable 25 Gram(s) IV Push once  dextrose 50% Injectable 12.5 Gram(s) IV Push once  divalproex  milliGRAM(s) Oral two times a day  famotidine    Tablet 40 milliGRAM(s) Oral daily  glucagon  Injectable 1 milliGRAM(s) IntraMuscular once  insulin lispro (ADMELOG) corrective regimen sliding scale   SubCutaneous every 6 hours  lisinopril 20 milliGRAM(s) Oral daily  piperacillin/tazobactam IVPB.. 3.375 Gram(s) IV Intermittent every 8 hours    MEDICATIONS  (PRN):  acetaminophen     Tablet .. 650 milliGRAM(s) Oral every 6 hours PRN Temp greater or equal to 38C (100.4F), Mild Pain (1 - 3)  dextrose Oral Gel 15 Gram(s) Oral once PRN Blood Glucose LESS THAN 70 milliGRAM(s)/deciliter  melatonin 3 milliGRAM(s) Oral at bedtime PRN Insomnia        CAPILLARY BLOOD GLUCOSE      POCT Blood Glucose.: 151 mg/dL (12 Feb 2024 09:29)  POCT Blood Glucose.: 59 mg/dL (12 Feb 2024 09:11)  POCT Blood Glucose.: 60 mg/dL (12 Feb 2024 09:09)  POCT Blood Glucose.: 67 mg/dL (12 Feb 2024 08:56)  POCT Blood Glucose.: 82 mg/dL (12 Feb 2024 06:30)  POCT Blood Glucose.: 75 mg/dL (12 Feb 2024 04:10)  POCT Blood Glucose.: 71 mg/dL (12 Feb 2024 01:09)  POCT Blood Glucose.: 90 mg/dL (11 Feb 2024 18:40)  POCT Blood Glucose.: 90 mg/dL (11 Feb 2024 13:27)    I&O's Summary    Vital Signs Last 24 Hrs  T(C): 36.4 (12 Feb 2024 09:05), Max: 37.2 (11 Feb 2024 18:38)  T(F): 97.5 (12 Feb 2024 09:05), Max: 98.9 (11 Feb 2024 18:38)  HR: 83 (12 Feb 2024 09:05) (73 - 106)  BP: 122/83 (12 Feb 2024 09:05) (83/61 - 122/83)  BP(mean): --  RR: 16 (12 Feb 2024 09:05) (16 - 20)  SpO2: 100% (12 Feb 2024 09:05) (97% - 100%)    Parameters below as of 12 Feb 2024 09:05  Patient On (Oxygen Delivery Method): room air      PHYSICAL EXAM:  GENERAL: NAD,   HEAD:  Atraumatic, Normocephalic  EYES: EOMI, PERRLA, conjunctiva and sclera clear  NECK: Supple, No JVD  CHEST/LUNG: Clear to auscultation bilaterally; No wheeze  HEART: Regular rate and rhythm; No murmurs, rubs, or gallops  ABDOMEN: Soft, ???Nontender, Negative [place] sign  Nondistended; Bowel sounds present  Patient stated she had pain on palpation to all 4 quadrant , but did not look in distress.  EXTREMITIES:  2+ Peripheral Pulses, No clubbing, cyanosis, or edema  PSYCH: Alert  NEUROLOGY: non-focal  SKIN: No rashes or lesions    LABS:                        10.8   5.05  )-----------( 304      ( 12 Feb 2024 06:00 )             34.8     02-12    135  |  92<L>  |  12  ----------------------------<  343<H>  3.9   |  21<L>  |  0.69    Ca    8.7      12 Feb 2024 06:00    TPro  7.6  /  Alb  3.7  /  TBili  0.7  /  DBili  x   /  AST  284<H>  /  ALT  424<H>  /  AlkPhos  226<H>  02-12        Care Discussed with Consultants/Other Providers:  RN, planning for OR today

## 2024-02-12 NOTE — PROGRESS NOTE ADULT - PROBLEM SELECTOR PLAN 4
- UA: cloudy, 15 ketones, small bilirubin, small LE, 2 WBCs, 52 RBCs, mod bacteria, 8 epis  - CTAP: mild circumferential urinary bladder wall thickening compatible with cystitis  - obtain UCx  - empirically covered with zosyn - home regimen: metformin 1g po BID  - f/s qachs – goal 140-180 while admitted; ISS qachs for now  2/12/24  Glucose 59 s/p D50 now 151.  D5NS IVF at 80 cc/hr.  Patient is alert and talking today 2/12/24

## 2024-02-13 LAB
GLUCOSE BLDC GLUCOMTR-MCNC: 105 MG/DL — HIGH (ref 70–99)
GLUCOSE BLDC GLUCOMTR-MCNC: 111 MG/DL — HIGH (ref 70–99)
GLUCOSE BLDC GLUCOMTR-MCNC: 114 MG/DL — HIGH (ref 70–99)
GLUCOSE BLDC GLUCOMTR-MCNC: 160 MG/DL — HIGH (ref 70–99)
GLUCOSE BLDC GLUCOMTR-MCNC: 89 MG/DL — SIGNIFICANT CHANGE UP (ref 70–99)
GLUCOSE BLDC GLUCOMTR-MCNC: 96 MG/DL — SIGNIFICANT CHANGE UP (ref 70–99)

## 2024-02-13 RX ORDER — INSULIN LISPRO 100/ML
VIAL (ML) SUBCUTANEOUS
Refills: 0 | Status: DISCONTINUED | OUTPATIENT
Start: 2024-02-13 | End: 2024-02-16

## 2024-02-13 RX ORDER — INSULIN LISPRO 100/ML
VIAL (ML) SUBCUTANEOUS AT BEDTIME
Refills: 0 | Status: DISCONTINUED | OUTPATIENT
Start: 2024-02-13 | End: 2024-02-16

## 2024-02-13 RX ADMIN — PIPERACILLIN AND TAZOBACTAM 25 GRAM(S): 4; .5 INJECTION, POWDER, LYOPHILIZED, FOR SOLUTION INTRAVENOUS at 13:18

## 2024-02-13 RX ADMIN — FAMOTIDINE 40 MILLIGRAM(S): 10 INJECTION INTRAVENOUS at 13:18

## 2024-02-13 RX ADMIN — DIVALPROEX SODIUM 500 MILLIGRAM(S): 500 TABLET, DELAYED RELEASE ORAL at 17:50

## 2024-02-13 RX ADMIN — DIVALPROEX SODIUM 500 MILLIGRAM(S): 500 TABLET, DELAYED RELEASE ORAL at 05:27

## 2024-02-13 RX ADMIN — SODIUM CHLORIDE 100 MILLILITER(S): 9 INJECTION, SOLUTION INTRAVENOUS at 05:13

## 2024-02-13 RX ADMIN — PIPERACILLIN AND TAZOBACTAM 25 GRAM(S): 4; .5 INJECTION, POWDER, LYOPHILIZED, FOR SOLUTION INTRAVENOUS at 21:01

## 2024-02-13 RX ADMIN — Medication 1: at 17:49

## 2024-02-13 RX ADMIN — PIPERACILLIN AND TAZOBACTAM 25 GRAM(S): 4; .5 INJECTION, POWDER, LYOPHILIZED, FOR SOLUTION INTRAVENOUS at 05:13

## 2024-02-13 RX ADMIN — LISINOPRIL 20 MILLIGRAM(S): 2.5 TABLET ORAL at 05:27

## 2024-02-14 LAB
-  AMOXICILLIN/CLAVULANIC ACID: SIGNIFICANT CHANGE UP
-  AMPICILLIN/SULBACTAM: SIGNIFICANT CHANGE UP
-  AMPICILLIN: SIGNIFICANT CHANGE UP
-  AZTREONAM: SIGNIFICANT CHANGE UP
-  CEFAZOLIN: SIGNIFICANT CHANGE UP
-  CEFEPIME: SIGNIFICANT CHANGE UP
-  CEFOXITIN: SIGNIFICANT CHANGE UP
-  CEFTRIAXONE: SIGNIFICANT CHANGE UP
-  CEFUROXIME: SIGNIFICANT CHANGE UP
-  CIPROFLOXACIN: SIGNIFICANT CHANGE UP
-  ERTAPENEM: SIGNIFICANT CHANGE UP
-  GENTAMICIN: SIGNIFICANT CHANGE UP
-  IMIPENEM: SIGNIFICANT CHANGE UP
-  LEVOFLOXACIN: SIGNIFICANT CHANGE UP
-  MEROPENEM: SIGNIFICANT CHANGE UP
-  NITROFURANTOIN: SIGNIFICANT CHANGE UP
-  PIPERACILLIN/TAZOBACTAM: SIGNIFICANT CHANGE UP
-  TOBRAMYCIN: SIGNIFICANT CHANGE UP
-  TRIMETHOPRIM/SULFAMETHOXAZOLE: SIGNIFICANT CHANGE UP
ANION GAP SERPL CALC-SCNC: 15 MMOL/L — HIGH (ref 7–14)
ANION GAP SERPL CALC-SCNC: 19 MMOL/L — HIGH (ref 7–14)
BUN SERPL-MCNC: 4 MG/DL — LOW (ref 7–23)
BUN SERPL-MCNC: 5 MG/DL — LOW (ref 7–23)
CALCIUM SERPL-MCNC: 9.4 MG/DL — SIGNIFICANT CHANGE UP (ref 8.4–10.5)
CALCIUM SERPL-MCNC: 9.4 MG/DL — SIGNIFICANT CHANGE UP (ref 8.4–10.5)
CHLORIDE SERPL-SCNC: 100 MMOL/L — SIGNIFICANT CHANGE UP (ref 98–107)
CHLORIDE SERPL-SCNC: 98 MMOL/L — SIGNIFICANT CHANGE UP (ref 98–107)
CO2 SERPL-SCNC: 14 MMOL/L — LOW (ref 22–31)
CO2 SERPL-SCNC: 23 MMOL/L — SIGNIFICANT CHANGE UP (ref 22–31)
CREAT SERPL-MCNC: 0.49 MG/DL — LOW (ref 0.5–1.3)
CREAT SERPL-MCNC: 0.57 MG/DL — SIGNIFICANT CHANGE UP (ref 0.5–1.3)
CULTURE RESULTS: ABNORMAL
EGFR: 127 ML/MIN/1.73M2 — SIGNIFICANT CHANGE UP
EGFR: 132 ML/MIN/1.73M2 — SIGNIFICANT CHANGE UP
GLUCOSE BLDC GLUCOMTR-MCNC: 152 MG/DL — HIGH (ref 70–99)
GLUCOSE SERPL-MCNC: 100 MG/DL — HIGH (ref 70–99)
GLUCOSE SERPL-MCNC: 133 MG/DL — HIGH (ref 70–99)
HAV IGM SER-ACNC: SIGNIFICANT CHANGE UP
HBV CORE IGM SER-ACNC: SIGNIFICANT CHANGE UP
HBV SURFACE AG SER-ACNC: SIGNIFICANT CHANGE UP
HCT VFR BLD CALC: 38 % — SIGNIFICANT CHANGE UP (ref 34.5–45)
HCV AB S/CO SERPL IA: 0.2 S/CO — SIGNIFICANT CHANGE UP (ref 0–0.99)
HCV AB SERPL-IMP: SIGNIFICANT CHANGE UP
HGB BLD-MCNC: 11.6 G/DL — SIGNIFICANT CHANGE UP (ref 11.5–15.5)
MAGNESIUM SERPL-MCNC: 2 MG/DL — SIGNIFICANT CHANGE UP (ref 1.6–2.6)
MAGNESIUM SERPL-MCNC: 2.2 MG/DL — SIGNIFICANT CHANGE UP (ref 1.6–2.6)
MCHC RBC-ENTMCNC: 20.1 PG — LOW (ref 27–34)
MCHC RBC-ENTMCNC: 30.5 GM/DL — LOW (ref 32–36)
MCV RBC AUTO: 65.7 FL — LOW (ref 80–100)
METHOD TYPE: SIGNIFICANT CHANGE UP
NRBC # BLD: 0 /100 WBCS — SIGNIFICANT CHANGE UP (ref 0–0)
NRBC # FLD: 0 K/UL — SIGNIFICANT CHANGE UP (ref 0–0)
ORGANISM # SPEC MICROSCOPIC CNT: ABNORMAL
ORGANISM # SPEC MICROSCOPIC CNT: ABNORMAL
PHOSPHATE SERPL-MCNC: 3 MG/DL — SIGNIFICANT CHANGE UP (ref 2.5–4.5)
PHOSPHATE SERPL-MCNC: 3.8 MG/DL — SIGNIFICANT CHANGE UP (ref 2.5–4.5)
PLATELET # BLD AUTO: 252 K/UL — SIGNIFICANT CHANGE UP (ref 150–400)
POTASSIUM SERPL-MCNC: 3.6 MMOL/L — SIGNIFICANT CHANGE UP (ref 3.5–5.3)
POTASSIUM SERPL-MCNC: SIGNIFICANT CHANGE UP MMOL/L (ref 3.5–5.3)
POTASSIUM SERPL-SCNC: 3.6 MMOL/L — SIGNIFICANT CHANGE UP (ref 3.5–5.3)
POTASSIUM SERPL-SCNC: SIGNIFICANT CHANGE UP MMOL/L (ref 3.5–5.3)
RBC # BLD: 5.78 M/UL — HIGH (ref 3.8–5.2)
RBC # FLD: 18.5 % — HIGH (ref 10.3–14.5)
SODIUM SERPL-SCNC: 131 MMOL/L — LOW (ref 135–145)
SODIUM SERPL-SCNC: 138 MMOL/L — SIGNIFICANT CHANGE UP (ref 135–145)
SPECIMEN SOURCE: SIGNIFICANT CHANGE UP
WBC # BLD: 6.27 K/UL — SIGNIFICANT CHANGE UP (ref 3.8–10.5)
WBC # FLD AUTO: 6.27 K/UL — SIGNIFICANT CHANGE UP (ref 3.8–10.5)

## 2024-02-14 PROCEDURE — 99232 SBSQ HOSP IP/OBS MODERATE 35: CPT

## 2024-02-14 PROCEDURE — 90792 PSYCH DIAG EVAL W/MED SRVCS: CPT

## 2024-02-14 RX ORDER — QUETIAPINE FUMARATE 200 MG/1
300 TABLET, FILM COATED ORAL AT BEDTIME
Refills: 0 | Status: DISCONTINUED | OUTPATIENT
Start: 2024-02-14 | End: 2024-02-16

## 2024-02-14 RX ORDER — OLANZAPINE 15 MG/1
5 TABLET, FILM COATED ORAL EVERY 6 HOURS
Refills: 0 | Status: DISCONTINUED | OUTPATIENT
Start: 2024-02-14 | End: 2024-02-16

## 2024-02-14 RX ADMIN — QUETIAPINE FUMARATE 300 MILLIGRAM(S): 200 TABLET, FILM COATED ORAL at 22:56

## 2024-02-14 RX ADMIN — PIPERACILLIN AND TAZOBACTAM 25 GRAM(S): 4; .5 INJECTION, POWDER, LYOPHILIZED, FOR SOLUTION INTRAVENOUS at 06:08

## 2024-02-14 RX ADMIN — PIPERACILLIN AND TAZOBACTAM 25 GRAM(S): 4; .5 INJECTION, POWDER, LYOPHILIZED, FOR SOLUTION INTRAVENOUS at 22:25

## 2024-02-14 RX ADMIN — Medication 1: at 09:26

## 2024-02-14 RX ADMIN — LISINOPRIL 20 MILLIGRAM(S): 2.5 TABLET ORAL at 05:08

## 2024-02-14 RX ADMIN — FAMOTIDINE 40 MILLIGRAM(S): 10 INJECTION INTRAVENOUS at 11:36

## 2024-02-14 RX ADMIN — DIVALPROEX SODIUM 500 MILLIGRAM(S): 500 TABLET, DELAYED RELEASE ORAL at 05:08

## 2024-02-14 RX ADMIN — PIPERACILLIN AND TAZOBACTAM 25 GRAM(S): 4; .5 INJECTION, POWDER, LYOPHILIZED, FOR SOLUTION INTRAVENOUS at 13:09

## 2024-02-14 NOTE — PROGRESS NOTE ADULT - PROBLEM SELECTOR PLAN 4
- UA: cloudy, 15 ketones, small bilirubin, small LE, 2 WBCs, 52 RBCs, mod bacteria, 8 epithelial cells  - CTAP: mild circumferential urinary bladder wall thickening compatible with cystitis  - UCx (2/11) pansensitive E. coli (50k-99k)  - empirically covered with zosyn, if remains nontoxic, can complete 5 day course through 2/16

## 2024-02-14 NOTE — PROGRESS NOTE ADULT - PROBLEM SELECTOR PLAN 3
- home regimen: metformin 1g po BID  - f/s qachs – goal 140-180 while admitted; ISS qachs for now  -A1c 5.7%

## 2024-02-14 NOTE — BH CONSULTATION LIAISON ASSESSMENT NOTE - HPI (INCLUDE ILLNESS QUALITY, SEVERITY, DURATION, TIMING, CONTEXT, MODIFYING FACTORS, ASSOCIATED SIGNS AND SYMPTOMS)
27yo F with PPH of intellectual disability, PMH significant for obesity, HTN, and DM admitted for emesis, transaminitis. Psych c/s for med mgmt. Patient limited on exam Aox1 at most, smiling and redirectable no agitation per nurses, mostly sits in bed, says she ate the food but could not eat much today because "I ate pam greens last night". Mood is "good". Denies AH/VH, delusions or paranoia. Denies suicidality/intent or plan and denies homicidality/intent or plan.     D/w brother - does not know meds well but states patient had lead poisoning as youth and has no history of seizures  D/w  - depakote ordered for behavioral reasons, no concern for seizures

## 2024-02-14 NOTE — PROGRESS NOTE ADULT - ASSESSMENT
Patient is a 29yo F with PMH significant for obesity, HTN, and DM who presented from [facility] home after multiple episodes of emesis of gastric contents on day of presentation, admitted with suspected cholecystitis.

## 2024-02-14 NOTE — PROGRESS NOTE ADULT - PROBLEM SELECTOR PLAN 1
#Suspected cholecystitis  - per surgery, low concern for acute cholecystitis at this time, and no acute surgical intervention  - RUQ US: cholelithiasis with associated gallbladder wall thickening  - CTAP: mild gallbladder wall edema  - HIDA scan: significantly delayed gallbladder filling may be see with acute/subacute cholecystitis  -  and bilirubin noted in urine, ,   - trend LFTs  - empirically covered with zosyn  - avoid hepatotoxins - surgery evaluation appreciated – no acute surgical intervention as suspicion low for acute cholecystitis (prior to HIDA scan)  - appreciate surgery input  - elevated LFT, check hepatitis panel, CMV/EBV PCR, dc tylenol/lisinopril/depakote as they can both potentially cause elevated LFT/transaminitis  check Valproic acid level in AM  consult psych to help medication management now she's taken off depakote #Suspected cholecystitis  - per surgery, low concern for acute cholecystitis at this time, and no acute surgical intervention  - RUQ US: cholelithiasis with associated gallbladder wall thickening  - CTAP: mild gallbladder wall edema  - HIDA scan: significantly delayed gallbladder filling may be see with acute/subacute cholecystitis  -  and bilirubin noted in urine, ,   - daily CMP to trend Lytes and LFTs  - empirically covered with zosyn  - avoid hepatotoxins - surgery input appreciated– no acute surgical intervention as suspicion low for acute cholecystitis (prior to HIDA scan)  - emailed GI (hepatology consult), f/u recs  - elevated LFT, check hepatitis panel, CMV/EBV PCR, PHONG, ASMA, anti-LKM1, dc tylenol/lisinopril/depakote as they can both potentially cause elevated LFT/transaminitis  check Valproic acid level in AM  consult psych to help medication management now she's taken off depakote

## 2024-02-14 NOTE — PROGRESS NOTE ADULT - SUBJECTIVE AND OBJECTIVE BOX
Dr. Jody Meza  Pager [phone]    PROGRESS NOTE:     Patient is a 28y old  Female who presents with a chief complaint of cholecystitis (12 Feb 2024 11:49)      SUBJECTIVE / OVERNIGHT EVENTS: denies chest pain or sob   ADDITIONAL REVIEW OF SYSTEMS: afebrile, no abd pain    MEDICATIONS  (STANDING):  dextrose 5% + sodium chloride 0.9%. 1000 milliLiter(s) (80 mL/Hr) IV Continuous <Continuous>  dextrose 5%. 1000 milliLiter(s) (100 mL/Hr) IV Continuous <Continuous>  dextrose 5%. 1000 milliLiter(s) (50 mL/Hr) IV Continuous <Continuous>  dextrose 5%. 1000 milliLiter(s) (100 mL/Hr) IV Continuous <Continuous>  dextrose 50% Injectable 25 Gram(s) IV Push once  dextrose 50% Injectable 12.5 Gram(s) IV Push once  dextrose 50% Injectable 25 Gram(s) IV Push once  famotidine    Tablet 40 milliGRAM(s) Oral daily  glucagon  Injectable 1 milliGRAM(s) IntraMuscular once  insulin lispro (ADMELOG) corrective regimen sliding scale   SubCutaneous three times a day before meals  insulin lispro (ADMELOG) corrective regimen sliding scale   SubCutaneous at bedtime  piperacillin/tazobactam IVPB.. 3.375 Gram(s) IV Intermittent every 8 hours    MEDICATIONS  (PRN):  dextrose Oral Gel 15 Gram(s) Oral once PRN Blood Glucose LESS THAN 70 milliGRAM(s)/deciliter  melatonin 3 milliGRAM(s) Oral at bedtime PRN Insomnia      CAPILLARY BLOOD GLUCOSE      POCT Blood Glucose.: 109 mg/dL (14 Feb 2024 11:52)  POCT Blood Glucose.: 152 mg/dL (14 Feb 2024 09:22)  POCT Blood Glucose.: 114 mg/dL (13 Feb 2024 21:26)  POCT Blood Glucose.: 160 mg/dL (13 Feb 2024 17:34)    I&O's Summary      PHYSICAL EXAM:  Vital Signs Last 24 Hrs  T(C): 37 (14 Feb 2024 10:33), Max: 37 (14 Feb 2024 10:33)  T(F): 98.6 (14 Feb 2024 10:33), Max: 98.6 (14 Feb 2024 10:33)  HR: 79 (14 Feb 2024 10:33) (70 - 79)  BP: 110/77 (14 Feb 2024 10:33) (102/69 - 126/81)  BP(mean): --  RR: 17 (14 Feb 2024 10:33) (17 - 18)  SpO2: 99% (14 Feb 2024 10:33) (98% - 100%)    Parameters below as of 14 Feb 2024 10:33  Patient On (Oxygen Delivery Method): room air      CONSTITUTIONAL: nad, obese,   RESPIRATORY: Normal respiratory effort; lungs are clear to auscultation bilaterally  CARDIOVASCULAR: Regular rate and rhythm, normal S1 and S2, no murmur/rub/gallop; No lower extremity edema; Peripheral pulses are 2+ bilaterally  ABDOMEN: Nontender to palpation, normoactive bowel sounds, no rebound/guarding; No hepatosplenomegaly  MUSCULOSKELETAL: no clubbing or cyanosis of digits; no joint swelling or tenderness to palpation  PSYCH: A+O to person, place, and time; affect appropriate    LABS:                        11.6   6.27  )-----------( 252      ( 14 Feb 2024 06:20 )             38.0     02-14    138  |  100  |  4<L>  ----------------------------<  133<H>  3.6   |  23  |  0.57    Ca    9.4      14 Feb 2024 10:35  Phos  3.0     02-14  Mg     2.00     02-14            Urinalysis Basic - ( 14 Feb 2024 10:35 )    Color: x / Appearance: x / SG: x / pH: x  Gluc: 133 mg/dL / Ketone: x  / Bili: x / Urobili: x   Blood: x / Protein: x / Nitrite: x   Leuk Esterase: x / RBC: x / WBC x   Sq Epi: x / Non Sq Epi: x / Bacteria: x      RADIOLOGY & ADDITIONAL TESTS:  Results Reviewed:   Imaging Personally Reviewed:  < from: NM Hepatobiliary Imaging (02.11.24 @ 12:34) >  IMPRESSION: Abnormal hepatobiliary scan.    Significantly delayed gallbladder filling may be seen with acute/subacute   cholecystitis although underlying liver dysfunction is a confounding   factor.      < from: CT Abdomen and Pelvis w/ IV Cont (02.11.24 @ 06:04) >  IMPRESSION:  Mild gallbladder wall edema. Consider HIDA scan for further evaluation.    Mild circumferential urinary bladder wallthickening, compatible with   cystitis. Recommend correlation with urinalysis.    < from: US Abdomen Upper Quadrant Right (02.11.24 @ 03:51) >  Cholelithiasis with associated gallbladder wall thickening but reportedly   negative sonographic Maxwell sign. HIDA scan can be considered for further   evaluation.        Electrocardiogram Personally Reviewed:    COORDINATION OF CARE:  Care Discussed with Consultants/Other Providers [Y/N]:  Prior or Outpatient Records Reviewed [Y/N]:

## 2024-02-14 NOTE — BH CONSULTATION LIAISON ASSESSMENT NOTE - NSBHCONSULTFOLLOWAFTERCARE_PSY_A_CORE FT
[facility]  [phone] [place] [address], [facility], First Floor, [place] [address]  [phone]  [email or web address]: [phone]

## 2024-02-14 NOTE — PROGRESS NOTE ADULT - PROBLEM SELECTOR PLAN 6
- continue home meds: divalproex ER 500mg po BID, folate, MVI  - clarify indications particularly for desmopressin 0.2mg po qhs (held for now), divalproex ER 500mg po BID, and Seroquel 150mg po BID  - hold depakote for transaminitis  - consult psych for medication management  - check am VPA level

## 2024-02-14 NOTE — BH CONSULTATION LIAISON ASSESSMENT NOTE - NSBHCHARTREVIEWVS_PSY_A_CORE FT
Vital Signs Last 24 Hrs  T(C): 37 (14 Feb 2024 10:33), Max: 37 (14 Feb 2024 10:33)  T(F): 98.6 (14 Feb 2024 10:33), Max: 98.6 (14 Feb 2024 10:33)  HR: 79 (14 Feb 2024 10:33) (70 - 79)  BP: 110/77 (14 Feb 2024 10:33) (102/69 - 126/81)  BP(mean): --  RR: 17 (14 Feb 2024 10:33) (17 - 18)  SpO2: 99% (14 Feb 2024 10:33) (98% - 100%)    Parameters below as of 14 Feb 2024 10:33  Patient On (Oxygen Delivery Method): room air

## 2024-02-14 NOTE — PROGRESS NOTE ADULT - PROBLEM SELECTOR PLAN 5
- resume home antihypertensives –  hold h ome lisinopril 20 mg qd, ACEI can be associated with elevated LFT/transaminitis

## 2024-02-14 NOTE — BH CONSULTATION LIAISON ASSESSMENT NOTE - SUMMARY
29yo F with PPH of intellectual disability, PMH significant for obesity, HTN, and DM admitted for emesis, transaminitis. Psych c/s for med mgmt. Patient limited on exam Aox1 at most, smiling and redirectable no agitation per nurses, mostly sits in bed, says she ate the food but could not eat much today because "I ate pam greens last night". Mood is "good". Denies AH/VH, delusions or paranoia. Denies suicidality/intent or plan and denies homicidality/intent or plan.    Patient appears to be at mental baseline, no signs of psychosis, severely intellectually disable but able to communicate in simple fashion. No EPS or asterxis on exam, denies abd pain. Confirmed that VPA is for behaviors, NOT for seizures, OK to hold.     Recs:  - defer to primary team re: obs, patient appears quite calm  - C/w Seroquel 300mg qHS (home dose)  - HOLD depakote while LFTs elevated  - PRN for agitation Olanzapine 5mg q6h PRN PO/IM  - No psych c/i to discharge back to group home

## 2024-02-14 NOTE — PROGRESS NOTE ADULT - PROBLEM SELECTOR PLAN 7
#VTE PPx: SCDs since she may go to OR  #GI: famotidine  #Diet: regular   Dispo: DC plan back to group home pending clinical course.     Discussed with  and patient's brother Shahbaz Seth [phone]

## 2024-02-14 NOTE — PROGRESS NOTE ADULT - PROBLEM SELECTOR PLAN 2
- patient is planned potentially for laparoscopic cholecystectomy  - baseline mets >4 per staff and family  - no personal or family reactions to anesthesia, or excessive bleeding/clotting  - ECG: NSR, no ischemic ST-T changes  - no absolute contraindications such as acute coronary syndrome, decompensated heart failure, or malignant arrhythmia  - Cr <2, T2DM w/o long-term use of insulin, no hx of stroke, CAD, of CHF  - RCRI of 0 confers a 0.4% estimated rate of myocardial infarction, pulmonary edema, ventricular fibrillation, cardiac arrest, or complete heart block (Garland 1999) or a 3.9% 30-day risk of death, MI, or cardiac arrest (Neto 2017)  - patient is low risk for an intermediate risk procedure; no further cardiac testing is required at this time and she may proceed with the planned procedure  - however, there's no plan for OR at this time

## 2024-02-14 NOTE — BH CONSULTATION LIAISON ASSESSMENT NOTE - MSE UNSTRUCTURED FT
Mental Status Exam:  Rodriguez: well groomed, fair hygiene     Behavior: calm, cooperative, no psychomotor retardation/agitation  Motor: no tremors, EPS, or rigidity  Gait: did not assess, pt in bed  Speech: normal rate, rhythm, prosedy and volume   Mood: "good"  Affect: euthymic, congruent  Thought process: impoverished  Thought Content: denies paranoia, delusions   Perception: denies AH/VH  SI: denies  HI: denies  Insight: poor  Judgment: poor    Cognitive Exam:  Orientation: AOx1  Recall: poor  Attention: poor  Abstraction: poor

## 2024-02-14 NOTE — BH CONSULTATION LIAISON ASSESSMENT NOTE - CURRENT MEDICATION
MEDICATIONS  (STANDING):  dextrose 5%. 1000 milliLiter(s) (50 mL/Hr) IV Continuous <Continuous>  dextrose 5%. 1000 milliLiter(s) (100 mL/Hr) IV Continuous <Continuous>  dextrose 5%. 1000 milliLiter(s) (100 mL/Hr) IV Continuous <Continuous>  dextrose 50% Injectable 12.5 Gram(s) IV Push once  dextrose 50% Injectable 25 Gram(s) IV Push once  dextrose 50% Injectable 25 Gram(s) IV Push once  famotidine    Tablet 40 milliGRAM(s) Oral daily  glucagon  Injectable 1 milliGRAM(s) IntraMuscular once  insulin lispro (ADMELOG) corrective regimen sliding scale   SubCutaneous three times a day before meals  insulin lispro (ADMELOG) corrective regimen sliding scale   SubCutaneous at bedtime  piperacillin/tazobactam IVPB.. 3.375 Gram(s) IV Intermittent every 8 hours    MEDICATIONS  (PRN):  dextrose Oral Gel 15 Gram(s) Oral once PRN Blood Glucose LESS THAN 70 milliGRAM(s)/deciliter  melatonin 3 milliGRAM(s) Oral at bedtime PRN Insomnia

## 2024-02-15 ENCOUNTER — TRANSCRIPTION ENCOUNTER (OUTPATIENT)
Age: 29
End: 2024-02-15

## 2024-02-15 LAB
ADD ON TEST-SPECIMEN IN LAB: SIGNIFICANT CHANGE UP
ALBUMIN SERPL ELPH-MCNC: 3.8 G/DL — SIGNIFICANT CHANGE UP (ref 3.3–5)
ALP SERPL-CCNC: 163 U/L — HIGH (ref 40–120)
ALT FLD-CCNC: 151 U/L — HIGH (ref 4–33)
ANION GAP SERPL CALC-SCNC: 12 MMOL/L — SIGNIFICANT CHANGE UP (ref 7–14)
AST SERPL-CCNC: 39 U/L — HIGH (ref 4–32)
B PERT DNA SPEC QL NAA+PROBE: SIGNIFICANT CHANGE UP
B PERT+PARAPERT DNA PNL SPEC NAA+PROBE: SIGNIFICANT CHANGE UP
BILIRUB DIRECT SERPL-MCNC: <0.2 MG/DL — SIGNIFICANT CHANGE UP (ref 0–0.3)
BILIRUB INDIRECT FLD-MCNC: >0 MG/DL — SIGNIFICANT CHANGE UP (ref 0–1)
BILIRUB SERPL-MCNC: 0.2 MG/DL — SIGNIFICANT CHANGE UP (ref 0.2–1.2)
BORDETELLA PARAPERTUSSIS (RAPRVP): SIGNIFICANT CHANGE UP
BUN SERPL-MCNC: 7 MG/DL — SIGNIFICANT CHANGE UP (ref 7–23)
C PNEUM DNA SPEC QL NAA+PROBE: SIGNIFICANT CHANGE UP
CALCIUM SERPL-MCNC: 9.5 MG/DL — SIGNIFICANT CHANGE UP (ref 8.4–10.5)
CHLORIDE SERPL-SCNC: 104 MMOL/L — SIGNIFICANT CHANGE UP (ref 98–107)
CO2 SERPL-SCNC: 26 MMOL/L — SIGNIFICANT CHANGE UP (ref 22–31)
CREAT SERPL-MCNC: 0.68 MG/DL — SIGNIFICANT CHANGE UP (ref 0.5–1.3)
EGFR: 122 ML/MIN/1.73M2 — SIGNIFICANT CHANGE UP
FLUAV SUBTYP SPEC NAA+PROBE: SIGNIFICANT CHANGE UP
FLUBV RNA SPEC QL NAA+PROBE: SIGNIFICANT CHANGE UP
GLUCOSE SERPL-MCNC: 97 MG/DL — SIGNIFICANT CHANGE UP (ref 70–99)
HADV DNA SPEC QL NAA+PROBE: SIGNIFICANT CHANGE UP
HBV SURFACE AB SER-ACNC: REACTIVE
HCOV 229E RNA SPEC QL NAA+PROBE: SIGNIFICANT CHANGE UP
HCOV HKU1 RNA SPEC QL NAA+PROBE: SIGNIFICANT CHANGE UP
HCOV NL63 RNA SPEC QL NAA+PROBE: SIGNIFICANT CHANGE UP
HCOV OC43 RNA SPEC QL NAA+PROBE: SIGNIFICANT CHANGE UP
HCT VFR BLD CALC: 36.8 % — SIGNIFICANT CHANGE UP (ref 34.5–45)
HGB BLD-MCNC: 11.2 G/DL — LOW (ref 11.5–15.5)
HMPV RNA SPEC QL NAA+PROBE: SIGNIFICANT CHANGE UP
HPIV1 RNA SPEC QL NAA+PROBE: SIGNIFICANT CHANGE UP
HPIV2 RNA SPEC QL NAA+PROBE: SIGNIFICANT CHANGE UP
HPIV3 RNA SPEC QL NAA+PROBE: SIGNIFICANT CHANGE UP
HPIV4 RNA SPEC QL NAA+PROBE: SIGNIFICANT CHANGE UP
M PNEUMO DNA SPEC QL NAA+PROBE: SIGNIFICANT CHANGE UP
MAGNESIUM SERPL-MCNC: 2.3 MG/DL — SIGNIFICANT CHANGE UP (ref 1.6–2.6)
MCHC RBC-ENTMCNC: 20.1 PG — LOW (ref 27–34)
MCHC RBC-ENTMCNC: 30.4 GM/DL — LOW (ref 32–36)
MCV RBC AUTO: 66.1 FL — LOW (ref 80–100)
NRBC # BLD: 0 /100 WBCS — SIGNIFICANT CHANGE UP (ref 0–0)
NRBC # FLD: 0 K/UL — SIGNIFICANT CHANGE UP (ref 0–0)
PHOSPHATE SERPL-MCNC: 4.1 MG/DL — SIGNIFICANT CHANGE UP (ref 2.5–4.5)
PLATELET # BLD AUTO: 298 K/UL — SIGNIFICANT CHANGE UP (ref 150–400)
POTASSIUM SERPL-MCNC: 4 MMOL/L — SIGNIFICANT CHANGE UP (ref 3.5–5.3)
POTASSIUM SERPL-SCNC: 4 MMOL/L — SIGNIFICANT CHANGE UP (ref 3.5–5.3)
PROT SERPL-MCNC: 7.1 G/DL — SIGNIFICANT CHANGE UP (ref 6–8.3)
RAPID RVP RESULT: SIGNIFICANT CHANGE UP
RBC # BLD: 5.57 M/UL — HIGH (ref 3.8–5.2)
RBC # FLD: 18.2 % — HIGH (ref 10.3–14.5)
RSV RNA SPEC QL NAA+PROBE: SIGNIFICANT CHANGE UP
RV+EV RNA SPEC QL NAA+PROBE: SIGNIFICANT CHANGE UP
SARS-COV-2 RNA SPEC QL NAA+PROBE: SIGNIFICANT CHANGE UP
SMOOTH MUSCLE AB SER-ACNC: ABNORMAL
SODIUM SERPL-SCNC: 142 MMOL/L — SIGNIFICANT CHANGE UP (ref 135–145)
VALPROATE SERPL-MCNC: 18.1 UG/ML — LOW (ref 50–100)
WBC # BLD: 5.64 K/UL — SIGNIFICANT CHANGE UP (ref 3.8–10.5)
WBC # FLD AUTO: 5.64 K/UL — SIGNIFICANT CHANGE UP (ref 3.8–10.5)

## 2024-02-15 PROCEDURE — 99232 SBSQ HOSP IP/OBS MODERATE 35: CPT

## 2024-02-15 RX ORDER — QUETIAPINE FUMARATE 200 MG/1
0.5 TABLET, FILM COATED ORAL
Refills: 0 | DISCHARGE
Start: 2024-02-15

## 2024-02-15 RX ORDER — QUETIAPINE FUMARATE 200 MG/1
0.5 TABLET, FILM COATED ORAL
Refills: 0 | DISCHARGE

## 2024-02-15 RX ORDER — LISINOPRIL 2.5 MG/1
1 TABLET ORAL
Refills: 0 | DISCHARGE

## 2024-02-15 RX ORDER — METFORMIN HYDROCHLORIDE 850 MG/1
1 TABLET ORAL
Qty: 30 | Refills: 0
Start: 2024-02-15 | End: 2024-03-15

## 2024-02-15 RX ORDER — METFORMIN HYDROCHLORIDE 850 MG/1
1 TABLET ORAL
Qty: 60 | Refills: 0
Start: 2024-02-15 | End: 2024-03-15

## 2024-02-15 RX ORDER — METFORMIN HYDROCHLORIDE 850 MG/1
1 TABLET ORAL
Qty: 30 | Refills: 0 | DISCHARGE
Start: 2024-02-15 | End: 2024-03-15

## 2024-02-15 RX ORDER — DIVALPROEX SODIUM 500 MG/1
1 TABLET, DELAYED RELEASE ORAL
Refills: 0 | DISCHARGE

## 2024-02-15 RX ORDER — DOCUSATE SODIUM 100 MG
1 CAPSULE ORAL
Refills: 0 | DISCHARGE

## 2024-02-15 RX ORDER — METFORMIN HYDROCHLORIDE 850 MG/1
1 TABLET ORAL
Refills: 0 | DISCHARGE

## 2024-02-15 RX ADMIN — FAMOTIDINE 40 MILLIGRAM(S): 10 INJECTION INTRAVENOUS at 13:07

## 2024-02-15 RX ADMIN — QUETIAPINE FUMARATE 300 MILLIGRAM(S): 200 TABLET, FILM COATED ORAL at 21:32

## 2024-02-15 RX ADMIN — PIPERACILLIN AND TAZOBACTAM 25 GRAM(S): 4; .5 INJECTION, POWDER, LYOPHILIZED, FOR SOLUTION INTRAVENOUS at 05:44

## 2024-02-15 RX ADMIN — PIPERACILLIN AND TAZOBACTAM 25 GRAM(S): 4; .5 INJECTION, POWDER, LYOPHILIZED, FOR SOLUTION INTRAVENOUS at 13:07

## 2024-02-15 RX ADMIN — PIPERACILLIN AND TAZOBACTAM 25 GRAM(S): 4; .5 INJECTION, POWDER, LYOPHILIZED, FOR SOLUTION INTRAVENOUS at 21:29

## 2024-02-15 NOTE — PROGRESS NOTE ADULT - PROBLEM SELECTOR PLAN 5
hold home lisinopril 20 mg qd, ACEI can be associated with elevated LFT/transaminitis  - bp controlled

## 2024-02-15 NOTE — PROGRESS NOTE ADULT - PROBLEM SELECTOR PLAN 1
- ruled out for acute cholecystitis per surgery  - per surgery, low concern for acute cholecystitis at this time, and no acute surgical intervention  - RUQ US: cholelithiasis with associated gallbladder wall thickening  - CTAP: mild gallbladder wall edema  - HIDA scan: significantly delayed gallbladder filling may be see with acute/subacute cholecystitis  - hepatitis w/u, LFT downtrending, alk ph os 163, ast/alt 39/151  f/u serologies, can f/up result as outpt, so far hepatitis panel neg  -  stop zosyn on DC  - avoid hepatotoxins - surgery input appreciated– no acute surgical intervention as suspicion low for acute cholecystitis (prior to HIDA scan)  -  - elevated LFT, check hepatitis panel, CMV/EBV PCR, PHONG, ASMA, anti-LKM1, dc tylenol/lisinopril/depakote as they can both potentially cause elevated LFT/transaminitis  check Valproic acid level in AM  consult psych to help medication management now she's taken off depakote - ruled out for acute cholecystitis per surgery  - per surgery, low concern for acute cholecystitis at this time, and no acute surgical intervention  - RUQ US: cholelithiasis with associated gallbladder wall thickening  - CTAP: mild gallbladder wall edema  - HIDA scan: significantly delayed gallbladder filling may be see with acute/subacute cholecystitis  - hepatitis w/u, LFT downtrending, alk ph os 163, ast/alt 39/151  f/u serologies, can f/up result as outpt, so far hepatitis panel neg  -  stop zosyn on DC  - avoid hepatotoxins - surgery input appreciated– no acute surgical intervention as suspicion low for acute cholecystitis (prior to HIDA scan)  -lisinopril/depakote dc'd in s/o transaminitis  -f/u serologies, LFT much improved  - Dispo: medically cleared for DC back to group home today

## 2024-02-15 NOTE — PROGRESS NOTE ADULT - PROBLEM SELECTOR PLAN 6
- continue home meds: divalproex ER 500mg po BID, folate, MVI  - clarify indications particularly for desmopressin 0.2mg po qhs (held for now), divalproex ER 500mg po BID, and Seroquel 150mg po BID  - hold depakote for transaminitis  - psych consulted, cleared for DC, no new psych meds, cont to hold depakote  - VPA level 18.1

## 2024-02-15 NOTE — PROGRESS NOTE ADULT - PROBLEM SELECTOR PLAN 4
- UA: cloudy, 15 ketones, small bilirubin, small LE, 2 WBCs, 52 RBCs, mod bacteria, 8 epithelial cells  - CTAP: mild circumferential urinary bladder wall thickening compatible with cystitis  - UCx (2/11) pansensitive E. coli (50k-99k)  - empirically covered with zosyn, wbc normal and nontoxic, stop abx on discharge

## 2024-02-15 NOTE — PROGRESS NOTE ADULT - SUBJECTIVE AND OBJECTIVE BOX
Dr. Jody Meza  Pager [phone]    PROGRESS NOTE:     Patient is a 28y old  Female who presents with a chief complaint of cholecystitis (14 Feb 2024 14:43)      SUBJECTIVE / OVERNIGHT EVENTS: pt denies abd pain or ruq pain  ADDITIONAL REVIEW OF SYSTEMS: afebrile     MEDICATIONS  (STANDING):  dextrose 5%. 1000 milliLiter(s) (50 mL/Hr) IV Continuous <Continuous>  dextrose 5%. 1000 milliLiter(s) (100 mL/Hr) IV Continuous <Continuous>  dextrose 5%. 1000 milliLiter(s) (100 mL/Hr) IV Continuous <Continuous>  dextrose 50% Injectable 25 Gram(s) IV Push once  dextrose 50% Injectable 12.5 Gram(s) IV Push once  dextrose 50% Injectable 25 Gram(s) IV Push once  famotidine    Tablet 40 milliGRAM(s) Oral daily  glucagon  Injectable 1 milliGRAM(s) IntraMuscular once  insulin lispro (ADMELOG) corrective regimen sliding scale   SubCutaneous three times a day before meals  insulin lispro (ADMELOG) corrective regimen sliding scale   SubCutaneous at bedtime  piperacillin/tazobactam IVPB.. 3.375 Gram(s) IV Intermittent every 8 hours  QUEtiapine 300 milliGRAM(s) Oral at bedtime    MEDICATIONS  (PRN):  dextrose Oral Gel 15 Gram(s) Oral once PRN Blood Glucose LESS THAN 70 milliGRAM(s)/deciliter  melatonin 3 milliGRAM(s) Oral at bedtime PRN Insomnia  OLANZapine 5 milliGRAM(s) Oral every 6 hours PRN agitation      CAPILLARY BLOOD GLUCOSE      POCT Blood Glucose.: 99 mg/dL (15 Feb 2024 08:05)  POCT Blood Glucose.: 166 mg/dL (14 Feb 2024 21:20)  POCT Blood Glucose.: 90 mg/dL (14 Feb 2024 17:46)    I&O's Summary      PHYSICAL EXAM:  Vital Signs Last 24 Hrs  T(C): 37.1 (15 Feb 2024 05:38), Max: 37.1 (14 Feb 2024 22:50)  T(F): 98.7 (15 Feb 2024 05:38), Max: 98.8 (14 Feb 2024 22:50)  HR: 78 (15 Feb 2024 05:38) (74 - 78)  BP: 100/69 (15 Feb 2024 05:38) (100/64 - 100/69)  BP(mean): --  RR: 17 (15 Feb 2024 05:38) (16 - 17)  SpO2: 99% (15 Feb 2024 05:38) (98% - 99%)    Parameters below as of 15 Feb 2024 05:38  Patient On (Oxygen Delivery Method): room air      CONSTITUTIONAL: nad, obese,   RESPIRATORY: Normal respiratory effort; lungs are clear to auscultation bilaterally  CARDIOVASCULAR: Regular rate and rhythm, normal S1 and S2, no murmur/rub/gallop; No lower extremity edema; Peripheral pulses are 2+ bilaterally  ABDOMEN: Nontender to palpation, normoactive bowel sounds, no rebound/guarding; No hepatosplenomegaly  MUSCULOSKELETAL: no clubbing or cyanosis of digits; no joint swelling or tenderness to palpation  PSYCH: A+O to person, place, and time; affect appropriate    LABS:                        11.2   5.64  )-----------( 298      ( 15 Feb 2024 06:30 )             36.8     02-15    142  |  104  |  7   ----------------------------<  97  4.0   |  26  |  0.68    Ca    9.5      15 Feb 2024 06:30  Phos  4.1     02-15  Mg     2.30     02-15    TPro  7.1  /  Alb  3.8  /  TBili  0.2  /  DBili  <0.2  /  AST  39<H>  /  ALT  151<H>  /  AlkPhos  163<H>  02-15          Urinalysis Basic - ( 15 Feb 2024 06:30 )    Color: x / Appearance: x / SG: x / pH: x  Gluc: 97 mg/dL / Ketone: x  / Bili: x / Urobili: x   Blood: x / Protein: x / Nitrite: x   Leuk Esterase: x / RBC: x / WBC x   Sq Epi: x / Non Sq Epi: x / Bacteria: x          RADIOLOGY & ADDITIONAL TESTS:  Results Reviewed:   Imaging Personally Reviewed:  Electrocardiogram Personally Reviewed:    COORDINATION OF CARE:  Care Discussed with Consultants/Other Providers [Y/N]:  Prior or Outpatient Records Reviewed [Y/N]:

## 2024-02-15 NOTE — CONSULT NOTE ADULT - SUBJECTIVE AND OBJECTIVE BOX
[facility]    Patient is a 28y old  Female who presents with a chief complaint of cholecystitis (15 Feb 2024 12:44)    HPI:  Ayse Seth is a 29yo F with PMH significant for intellectual disability, obesity, HTN, and DM who presented from group home after multiple episodes of emesis of gastric contents on day of presentation along with altered behavior. Hepatology consulted for Elevated LFTs.  Patient reports that nausea has since improved but has some abdominal pain.  Patient points to periumbilical region for localization of pain, but reports that her abdomen is diffusely TTP on exam.      HPI obtained from chart review as patient is unable to provide H&P.  Subjective obtained from patient.          SocHx:    PMH/PSH:  PAST MEDICAL & SURGICAL HISTORY:  Intellectual disability      Diabetes      Hypertension      Obesity      No significant past surgical history        FH:  FAMILY HISTORY:  No pertinent family history in first degree relatives        MEDS:  MEDICATIONS  (STANDING):  dextrose 5%. 1000 milliLiter(s) (50 mL/Hr) IV Continuous <Continuous>  dextrose 5%. 1000 milliLiter(s) (100 mL/Hr) IV Continuous <Continuous>  dextrose 5%. 1000 milliLiter(s) (100 mL/Hr) IV Continuous <Continuous>  dextrose 50% Injectable 25 Gram(s) IV Push once  dextrose 50% Injectable 12.5 Gram(s) IV Push once  dextrose 50% Injectable 25 Gram(s) IV Push once  famotidine    Tablet 40 milliGRAM(s) Oral daily  glucagon  Injectable 1 milliGRAM(s) IntraMuscular once  insulin lispro (ADMELOG) corrective regimen sliding scale   SubCutaneous three times a day before meals  insulin lispro (ADMELOG) corrective regimen sliding scale   SubCutaneous at bedtime  piperacillin/tazobactam IVPB.. 3.375 Gram(s) IV Intermittent every 8 hours  QUEtiapine 300 milliGRAM(s) Oral at bedtime    MEDICATIONS  (PRN):  dextrose Oral Gel 15 Gram(s) Oral once PRN Blood Glucose LESS THAN 70 milliGRAM(s)/deciliter  melatonin 3 milliGRAM(s) Oral at bedtime PRN Insomnia  OLANZapine 5 milliGRAM(s) Oral every 6 hours PRN agitation    Allergies    Haldol (Other)  Abilify (Other)  risperidone (Other)    Intolerances            divalproex DR   500 milliGRAM(s) Oral (02-14-24 @ 05:08)   500 milliGRAM(s) Oral (02-13-24 @ 17:50)    famotidine    Tablet   40 milliGRAM(s) Oral (02-15-24 @ 13:07)   40 milliGRAM(s) Oral (02-14-24 @ 11:36)    insulin lispro (ADMELOG) corrective regimen sliding scale   1 Unit(s) SubCutaneous (02-14-24 @ 09:26)    insulin lispro (ADMELOG) corrective regimen sliding scale   1 Unit(s) SubCutaneous (02-13-24 @ 17:49)    lisinopril   20 milliGRAM(s) Oral (02-14-24 @ 05:08)    piperacillin/tazobactam IVPB..   25 mL/Hr IV Intermittent (02-15-24 @ 13:07)   25 mL/Hr IV Intermittent (02-15-24 @ 05:44)    QUEtiapine   300 milliGRAM(s) Oral (02-14-24 @ 22:56)          ROS as per HPI      ______________________________________________________________________  PHYSICAL EXAM:  T(C): 37.1 (02-15-24 @ 05:38), Max: 37.1 (02-14-24 @ 22:50)  HR: 78 (02-15-24 @ 05:38)  BP: 100/69 (02-15-24 @ 05:38)  RR: 17 (02-15-24 @ 05:38)  SpO2: 99% (02-15-24 @ 05:38)  Wt(kg): --      GEN: NAD, normocephalic, intellectual disability  CVS: RRR no m/r/g  CHEST: breathing comfortably, no signs of resp distress, CTAB, no w/r/r  ABD: soft , nontender, nondistended, no HSM  EXTR: no cyanosis, no clubbing, no edema  NEURO: Moving all extremities, no focal deficits, conversant  SKIN:  warm;  non icteric    ______________________________________________________________________  LABS:                        11.2   5.64  )-----------( 298      ( 15 Feb 2024 06:30 )             36.8     02-15    142  |  104  |  7   ----------------------------<  97  4.0   |  26  |  0.68    Ca    9.5      15 Feb 2024 06:30  Phos  4.1     02-15  Mg     2.30     02-15    TPro  7.1  /  Alb  3.8  /  TBili  0.2  /  DBili  <0.2  /  AST  39<H>  /  ALT  151<H>  /  AlkPhos  163<H>  02-15    LIVER FUNCTIONS - ( 15 Feb 2024 11:19 )  Alb: 3.8 g/dL / Pro: 7.1 g/dL / ALK PHOS: 163 U/L / ALT: 151 U/L / AST: 39 U/L / GGT: x             ____________________________________________    IMAGING:    ______________________________________________________________________          VS:  T(C): 37.1 (02-15-24 @ 05:38), Max: 37.1 (02-14-24 @ 22:50)  T(F): 98.7 (02-15-24 @ 05:38), Max: 98.8 (02-14-24 @ 22:50)  HR: 78 (02-15-24 @ 05:38) (74 - 78)  BP: 100/69 (02-15-24 @ 05:38) (100/64 - 100/69)  RR: 17 (02-15-24 @ 05:38) (16 - 17)  SpO2: 99% (02-15-24 @ 05:38) (98% - 99%)    Labs/Imaging reviewed.    Hemoglobin: 11.2 g/dL (02-15-24 @ 06:30)  Hemoglobin: 11.6 g/dL (02-14-24 @ 06:20)    Blood Urea Nitrogen: 7 mg/dL (02-15-24 @ 06:30)  /Creatinine: 0.68 mg/dL (02-15-24 @ 06:30)    Aspartate Aminotransferase (AST/SGOT): 39 U/L (02-15-24 @ 11:19)  /Alanine Aminotransferase (ALT/SGPT): 151 U/L (02-15-24 @ 11:19)    Alkaline Phosphatase: 163 U/L (02-15-24 @ 11:19)    Bilirubin Total: 0.2 mg/dL (02-15-24 @ 11:19)  /Bilirubin Direct: <0.2 mg/dL (02-15-24 @ 11:19)    Albumin: 3.8 g/dL (02-15-24 @ 11:19)              Hepatitis serologies:    Hepatitis B Surface Antigen: Nonreact (02-14-24 @ 10:35)  Hepatitis C Virus S/CO Ratio: 0.20 S/CO (02-14-24 @ 10:35)  Hepatitis B Core IgM Antibody: Nonreact (02-14-24 @ 10:35)    [unfilled] ***do not include MELD if not ESLD/ if only consulted for abnl LTs      Imaging:  CT Abdomen and Pelvis w/ IV Cont:   ACC: [id number] EXAM:  CT ABDOMEN AND PELVIS IC   ORDERED BY: DYLON BENNETT     PROCEDURE DATE:  02/11/2024          INTERPRETATION:  CLINICAL INFORMATION: Elevated transaminases    COMPARISON: Abdominal ultrasound 2/11/2024    CONTRAST/COMPLICATIONS:  IV Contrast: Omnipaque 350  97 cc administered   3 cc discarded  Oral Contrast: NONE  Complications: None reported at time of study completion    PROCEDURE:  CT of the Abdomen and Pelvis was performed.  Sagittal and coronal reformats were performed.    FINDINGS:  LOWER CHEST: Within normal limits.    LIVER: Within normal limits.  BILE DUCTS: Normal caliber.  GALLBLADDER: Mild mural edema.  SPLEEN: Within normal limits.  PANCREAS: Within normal limits.  ADRENALS: Within normal limits.  KIDNEYS/URETERS: Within normal limits.    BLADDER: Mild circumferential wall thickening.  REPRODUCTIVE ORGANS: Uterus and adnexa within normal limits.    BOWEL: No bowel obstruction. Appendix is normal.  PERITONEUM: No ascites.  VESSELS: Within normal limits.  RETROPERITONEUM/LYMPH NODES: No lymphadenopathy.  ABDOMINAL WALL: Within normal limits.  BONES: Within normal limits.    IMPRESSION:  Mild gallbladder wall edema. Consider HIDA scan for further evaluation.    Mild circumferential urinary bladder wallthickening, compatible with   cystitis. Recommend correlation with urinalysis.    --- End of Report ---          JADE FUNG MD; Resident Radiologist  This document has been electronically signed.  KAVITA STREET MD; Attending Radiologist  This document has been electronically signed. Feb 11 2024  6:54AM (02-11-24 @ 06:04)  US Abdomen Upper Quadrant Right:   ACC: [id number] EXAM:  US ABDOMEN RT UPR QUADRANT   ORDERED BY: GAVIN OLMOS     PROCEDURE DATE:  02/11/2024          INTERPRETATION:  CLINICAL INFORMATION: Vomiting. Elevated transaminases.    COMPARISON: None available.    TECHNIQUE: Sonography of the right upper quadrant.    FINDINGS:  Liver: Within normal limits.  Bile ducts: Normal caliber. Common bile duct measures 5 mm.  Gallbladder: Cholelithiasis. Mural thickening, measuring up to 5 mm. No   pericholecystic fluid. Negative sonographic Maxwell's sign.  Pancreas: Poorly visualized.  Right kidney: 10.4 cm. No hydronephrosis.  Ascites: None.  IVC: Visualized portions are within normal limits.    IMPRESSION:  Cholelithiasis with associated gallbladder wall thickening but reportedly   negative sonographic Maxwell sign. HIDA scan can be considered for further   evaluation.    --- End of Report ---          JADE FUNG MD; Resident Radiologist  This document has been electronically signed.  GLORIA RODRÍGUEZ MD; Attending Radiologist  This document has been electronically signed. Feb 11 2024  4:14AM (02-11-24 @ 03:51)

## 2024-02-15 NOTE — DISCHARGE NOTE PROVIDER - NSDCCPCAREPLAN_GEN_ALL_CORE_FT
PRINCIPAL DISCHARGE DIAGNOSIS  Diagnosis: Abdominal pain with vomiting  Assessment and Plan of Treatment: resolved, no clear evidence of gallbladder infection as per surgery, treated with a course of antibiotics      SECONDARY DISCHARGE DIAGNOSES  Diagnosis: Elevated liver function tests  Assessment and Plan of Treatment: we stopped your lisinopril and depakote (valproic acid) for now due to elevated liver enzymes, stop taking these two medications, follow up with your primary physician and psychiatrist as outpatient    Diagnosis: Gallstones  Assessment and Plan of Treatment: no gallbladder infection , no need for surgery    Diagnosis: Acute UTI  Assessment and Plan of Treatment: treated with course of antibiotic     PRINCIPAL DISCHARGE DIAGNOSIS  Diagnosis: Abdominal pain with vomiting  Assessment and Plan of Treatment: Resolved, no clear evidence of gallbladder infection as per surgery, treated with a course of antibiotics      SECONDARY DISCHARGE DIAGNOSES  Diagnosis: Gallstones  Assessment and Plan of Treatment: No gallbladder infection , no need for surgery    Diagnosis: Acute UTI  Assessment and Plan of Treatment: Treated with course of antibiotic    Diagnosis: Elevated liver function tests  Assessment and Plan of Treatment: We stopped your lisinopril and depakote (valproic acid) for now due to elevated liver enzymes, stop taking these two medications, follow up with your primary physician and psychiatrist as outpatient

## 2024-02-15 NOTE — CONSULT NOTE ADULT - ASSESSMENT
Ayse Seth is a 27yo F with PMH significant for intellectual disability, obesity, HTN, and DM who presented from group home after multiple episodes of emesis of gastric contents on day of presentation along with altered behavior. Hepatology consulted for Elevated LFTs.  However, LFTs have not been drawn since the patient was admitted so it is unclear what the trend of LFTs are.  On admission liver labs significant for: , , . UA with culture significant for E coli.  Patient being treated with Zosyn.  RUQ US: cholelithiasis with associated gallbladder wall thickening.  CTAP: mild gallbladder wall edema; mild circumferential urinary bladder wall thickening compatible with cystitis.  HIDA scan: significantly delayed gallbladder filling may be see with acute/subacute cholecystitis.  Primary team is concerned for cholecystitis.  Surgery consulted who do not believe this is an acute cholecystitis picture.  Due to absence of LFT trending, unclear etiology of elevated LFTs.  DDx includes gallstone which has since passed vs valproic acid DILI (unlikely in setting of patient's chronic use) vs viral.  Hepatitis panel negative.  Most likely passed gallstone    Recommendations  - Please trend comprehensive metabolic panel and INR  - F/u hepatitis panel and autoimmune work up  - Please obtain CMV, EBV, VZV, syphilis, HIV, and HSV serologies  - Please obtain CMV and EBV PCR  - Please obtain ammonia level as valproate can cause non-cirrhotic hyperammonemia    Note incomplete until signed by attending     Gio Frank MD  PGY1  Please contact via TEAMS    NON-URGENT CONSULTS:  Please email [email or web address]  [email or web address]

## 2024-02-15 NOTE — PROGRESS NOTE ADULT - PROBLEM SELECTOR PLAN 7
#VTE PPx: SCDs   #GI: famotidine  #Diet: regular   Dispo: DC plan back to group home today    Discussed with  and patient's brother Shahbaz Seth [phone]

## 2024-02-15 NOTE — PROGRESS NOTE ADULT - ASSESSMENT
Patient is a 27yo F with PMH significant for obesity, HTN, and DM who presented from [facility] home after multiple episodes of emesis of gastric contents on day of presentation, admitted with suspected cholecystitis.

## 2024-02-15 NOTE — DISCHARGE NOTE PROVIDER - NSDCMRMEDTOKEN_GEN_ALL_CORE_FT
famotidine 40 mg oral tablet: 1 tab(s) orally  MetFORMIN (Eqv-Fortamet) 1000 mg oral tablet, extended release: 1 tab(s) orally once a day  QUEtiapine 300 mg oral tablet: 1 tab(s) orally once a day (at bedtime)   famotidine 40 mg oral tablet: 1 tab(s) orally once a day  MetFORMIN (Eqv-Fortamet) 1000 mg oral tablet, extended release: 1 tab(s) orally 2 times a day  QUEtiapine 300 mg oral tablet: 0.5 tab(s) orally 2 times a day

## 2024-02-15 NOTE — DISCHARGE NOTE PROVIDER - NSDCFUADDAPPT_GEN_ALL_CORE_FT
Follow up primary care physician in 1-2 weeks Follow up primary care physician in 1-2 weeks    You were seen by Hepatology in-hospital for elevated liver function tests, now improving. Please follow up with your doctor for blood work to monitor liver function tests and also to obtain further labs as per recommendations of the Hepatology team (CMV, VZV, syphilis screen, HIV, HSV, ammonia).

## 2024-02-16 ENCOUNTER — TRANSCRIPTION ENCOUNTER (OUTPATIENT)
Age: 29
End: 2024-02-16

## 2024-02-16 VITALS
OXYGEN SATURATION: 98 % | DIASTOLIC BLOOD PRESSURE: 76 MMHG | HEART RATE: 98 BPM | SYSTOLIC BLOOD PRESSURE: 120 MMHG | TEMPERATURE: 99 F | RESPIRATION RATE: 17 BRPM

## 2024-02-16 LAB
ANA TITR SER: NEGATIVE — SIGNIFICANT CHANGE UP
EBV DNA SERPL NAA+PROBE-ACNC: SIGNIFICANT CHANGE UP IU/ML
EBVPCR LOG: SIGNIFICANT CHANGE UP LOG10IU/ML
LKM AB SER-ACNC: <20.1 UNITS — SIGNIFICANT CHANGE UP (ref 0–20)
MITOCHONDRIA AB SER-ACNC: SIGNIFICANT CHANGE UP

## 2024-02-16 PROCEDURE — 99238 HOSP IP/OBS DSCHRG MGMT 30/<: CPT

## 2024-02-16 RX ORDER — FAMOTIDINE 10 MG/ML
1 INJECTION INTRAVENOUS
Qty: 30 | Refills: 0
Start: 2024-02-16 | End: 2024-03-16

## 2024-02-16 RX ORDER — FAMOTIDINE 10 MG/ML
1 INJECTION INTRAVENOUS
Refills: 0 | DISCHARGE

## 2024-02-16 RX ORDER — QUETIAPINE FUMARATE 200 MG/1
0.5 TABLET, FILM COATED ORAL
Qty: 30 | Refills: 0
Start: 2024-02-16 | End: 2024-03-16

## 2024-02-16 RX ORDER — METFORMIN HYDROCHLORIDE 850 MG/1
1 TABLET ORAL
Qty: 60 | Refills: 0
Start: 2024-02-16 | End: 2024-03-16

## 2024-02-16 RX ADMIN — PIPERACILLIN AND TAZOBACTAM 25 GRAM(S): 4; .5 INJECTION, POWDER, LYOPHILIZED, FOR SOLUTION INTRAVENOUS at 05:19

## 2024-02-16 RX ADMIN — FAMOTIDINE 40 MILLIGRAM(S): 10 INJECTION INTRAVENOUS at 13:02
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
## 2024-02-16 NOTE — PROVIDER CONTACT NOTE (OTHER) - ACTION/TREATMENT ORDERED:
Provider notified and made aware. Covering manager DANIE Way made aware. No new interventions ordered.

## 2024-02-16 NOTE — PROGRESS NOTE ADULT - PROBLEM SELECTOR PLAN 7
#VTE PPx: SCDs   #GI: famotidine  #Diet: regular   Dispo: DC plan back to group home today    Discussed with  and patient's brother Shahbaz Seth [phone]

## 2024-02-16 NOTE — DISCHARGE NOTE NURSING/CASE MANAGEMENT/SOCIAL WORK - PATIENT PORTAL LINK FT
You can access the FollowMyHealth Patient Portal offered by [facility] by registering at the following website: [email or web address]. By joining [facility]’s FollowMyHealth portal, you will also be able to view your health information using other applications (apps) compatible with our system.

## 2024-02-16 NOTE — DISCHARGE NOTE NURSING/CASE MANAGEMENT/SOCIAL WORK - NSDCPEFALRISK_GEN_ALL_CORE
For information on Fall & Injury Prevention, visit: [email or web address] OR  [email or web address] OR  https://www.cdc.gov/steadi/patient.html
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
## 2024-02-16 NOTE — DISCHARGE NOTE NURSING/CASE MANAGEMENT/SOCIAL WORK - NSDCFUADDAPPT_GEN_ALL_CORE_FT
Follow up primary care physician in 1-2 weeks    You were seen by Hepatology in-hospital for elevated liver function tests, now improving. Please follow up with your doctor for blood work to monitor liver function tests and also to obtain further labs as per recommendations of the Hepatology team (CMV, VZV, syphilis screen, HIV, HSV, ammonia).

## 2024-02-16 NOTE — PROGRESS NOTE ADULT - ASSESSMENT
Patient is a 29yo F with PMH significant for obesity, HTN, and DM who presented from [facility] home after multiple episodes of emesis of gastric contents on day of presentation, admitted with elevated LFT, suspected cholecystitis, now ruled out for acute cholecystitis and LFT improved

## 2024-05-28 NOTE — ED PROVIDER NOTE - NSTIMEPROVIDERCAREINITIATE_GEN_ER
10-Feb-2024 23:53
Quality 226: Preventive Care And Screening: Tobacco Use: Screening And Cessation Intervention: Patient screened for tobacco use and is an ex/non-smoker
Detail Level: Detailed
Quality 130: Documentation Of Current Medications In The Medical Record: Current Medications Documented

## 2024-09-23 NOTE — ED ADULT TRIAGE NOTE - CHIEF COMPLAINT QUOTE
None OCT imaging  Pain management  No OR recommended per orthopedic surgery  PT/OT   Pt arrives from group home, nonverbal at baseline, as per EMS pt has been vomiting throughout the day, no diarrhea. Past medical history of intellectual disabilities, DM type 2, non-insulin dependent.